# Patient Record
Sex: MALE | Race: WHITE | Employment: OTHER | URBAN - METROPOLITAN AREA
[De-identification: names, ages, dates, MRNs, and addresses within clinical notes are randomized per-mention and may not be internally consistent; named-entity substitution may affect disease eponyms.]

---

## 2017-11-07 ENCOUNTER — OFFICE VISIT (OUTPATIENT)
Dept: OPHTHALMOLOGY | Facility: CLINIC | Age: 82
End: 2017-11-07

## 2017-11-07 DIAGNOSIS — H52.00 HYPERMETROPIA, UNSPECIFIED LATERALITY: ICD-10-CM

## 2017-11-07 DIAGNOSIS — H25.10 SENILE NUCLEAR SCLEROSIS, UNSPECIFIED LATERALITY: Primary | ICD-10-CM

## 2017-11-07 RX ORDER — PHENOL 1.4 %
6 AEROSOL, SPRAY (ML) MUCOUS MEMBRANE
COMMUNITY

## 2017-11-07 RX ORDER — LOSARTAN POTASSIUM 25 MG/1
75 TABLET ORAL
COMMUNITY
Start: 2016-10-31

## 2017-11-07 RX ORDER — WARFARIN SODIUM 5 MG/1
TABLET ORAL
COMMUNITY
Start: 2017-06-07

## 2017-11-07 RX ORDER — CLONAZEPAM 0.5 MG/1
0.12 TABLET ORAL
COMMUNITY
Start: 2017-09-22 | End: 2018-08-07 | Stop reason: ALTCHOICE

## 2017-11-07 ASSESSMENT — VISUAL ACUITY
OD_SC+: -2
OS_SC: 20/40
OD_SC: 20/30
METHOD: SNELLEN - LINEAR
OS_PH_SC: 20/25

## 2017-11-07 ASSESSMENT — REFRACTION_MANIFEST
OS_CYLINDER: +0.25
OS_ADD: +2.50
OS_AXIS: 075
OD_AXIS: 155
OD_SPHERE: +0.75
OD_ADD: +2.50
OD_CYLINDER: +0.25
OS_SPHERE: +0.50

## 2017-11-07 ASSESSMENT — EXTERNAL EXAM - LEFT EYE: OS_EXAM: NORMAL

## 2017-11-07 ASSESSMENT — REFRACTION_WEARINGRX
OD_SPHERE: +2.50
OS_SPHERE: +2.50

## 2017-11-07 ASSESSMENT — CONF VISUAL FIELD
OD_NORMAL: 1
OS_NORMAL: 1
METHOD: COUNTING FINGERS

## 2017-11-07 ASSESSMENT — EXTERNAL EXAM - RIGHT EYE: OD_EXAM: NORMAL

## 2017-11-07 ASSESSMENT — CUP TO DISC RATIO: OS_RATIO: 0.35

## 2017-11-07 ASSESSMENT — SLIT LAMP EXAM - LIDS
COMMENTS: NORMAL
COMMENTS: NORMAL

## 2017-11-07 ASSESSMENT — TONOMETRY
OD_IOP_MMHG: 07
OS_IOP_MMHG: 13
IOP_METHOD: ICARE

## 2017-11-07 ASSESSMENT — PACHYMETRY
OD_CT(UM): 575
EXAM_DATE: 11/19/2014
OS_CT(UM): 578

## 2017-11-07 NOTE — NURSING NOTE
Chief Complaints and History of Present Illnesses   Patient presents with     Yearly Exam      Glaucoma suspect     HPI    Affected eye(s):  Both   Symptoms:        Frequency:  Constant       Do you have eye pain now?:  No      Comments:  Feels that the va has decreased and feels that it might be due to a medication  No F&F  Has a small brown spot in the center of the va for about 6 yrs  Debra LUEVANO 10:08 AM November 7, 2017

## 2017-11-07 NOTE — MR AVS SNAPSHOT
After Visit Summary   2017    Jair Quintanilla    MRN: [id number]           Patient Information     Date Of Birth          1935        Visit Information        Provider Department      2017 10:00 AM Neptali Chawla MD [facility]        Today's Diagnoses     Senile nuclear sclerosis, unspecified laterality - Both Eyes    -  1    Hypermetropia, unspecified laterality - Both Eyes           Follow-ups after your visit        Follow-up notes from your care team     Return in about 1 year (around 2018) for Complete Eye Exam, Cataract.      Who to contact     Please call your clinic at [phone] to:    Ask questions about your health    Make or cancel appointments    Discuss your medicines    Learn about your test results    Speak to your doctor   If you have compliments or concerns about an experience at your clinic, or if you wish to file a complaint, please contact [facility] Patient Relations at [phone] or email us at [email or web address]         Additional Information About Your Visit        MyChart Information     [employer] is an electronic gateway that provides easy, online access to your medical records. With [employer], you can request a clinic appointment, read your test results, renew a prescription or communicate with your care team.     To sign up for [employer] visit the website at [email or web address]   You will be asked to enter the access code listed below, as well as some personal information. Please follow the directions to create your username and password.     Your access code is: [email or web address]  Expires: 2018  5:30 AM     Your access code will  in 90 days. If you need help or a new code, please contact your [facility] or call [phone] for assistance.        Care EveryWhere ID     This is your Care EveryWhere ID. This could be used by other  organizations to access your [place] medical records  [id number]         Blood Pressure from Last 3 Encounters:   No data found for BP    Weight from Last 3 Encounters:   No data found for Wt              Today, you had the following     No orders found for display       Primary Care Provider Office Phone # Fax #    Jami Woodruff [phone]       [facility] [address]  [place] [address]        Equal Access to Services     NANCY [facility] HospitalRADHA : Hadii aad ku hadasho Soomaali, waaxda luqadaha, qaybta kaalmada adeegyada, waxay idiin hayaan adeeg kharash la'aan . So [facility] [phone].    ATENCIÓN: Si habla español, tiene a tai disposición servicios gratuitos de asistencia lingüística. [facility] [phone].    We comply with applicable federal civil rights laws and [place] laws. We do not discriminate on the basis of race, color, national origin, age, disability, sex, sexual orientation, or gender identity.            Thank you!     Thank you for choosing [facility]  for your care. Our goal is always to provide you with excellent care. Hearing back from our patients is one way we can continue to improve our services. Please take a few minutes to complete the written survey that you may receive in the mail after your visit with us. Thank you!             Your Updated Medication List - Protect others around you: Learn how to safely use, store and throw away your medicines at www.disposemymeds.org.          This list is accurate as of: 11/7/17  8:23 PM.  Always use your most recent med list.                   Brand Name Dispense Instructions for use Diagnosis    aspirin 81 MG tablet      Take 81 mg by mouth daily        clonazePAM 0.5 MG tablet    klonoPIN     Take 0.125 mg by mouth        * LOSARTAN POTASSIUM PO      Take 12.5 mg by mouth daily        * losartan 25 MG tablet    COZAAR     Take 75 mg by mouth        Melatonin 10 MG Tabs tablet       Take 5 mg by mouth        MELATONIN PO      Take 5 mg by mouth At Bedtime        METOPROLOL TARTRATE PO      Take 100 mg by mouth daily        UNABLE TO FIND      Lutien        VITAMIN D-1000 MAX ST 1000 UNITS Tabs   Generic drug:  cholecalciferol      Take 2,000 Units by mouth        * WARFARIN SODIUM PO      Take 27.5 mg by mouth every 7 days 2.5 or 5 mg daily        * warfarin 5 MG tablet    COUMADIN     TAKE 5MG (1 TABLET) 4 DAYS A WEEK AND 2.5MG (1/2 TABLET) 3 DAYS A WEEK.        * Notice:  This list has 4 medication(s) that are the same as other medications prescribed for you. Read the directions carefully, and ask your doctor or other care provider to review them with you.

## 2017-11-08 NOTE — PROGRESS NOTES
Assessment & Plan      Jair Quintanilla is a 82 year old male with the following diagnoses:   (H25.10) Senile nuclear sclerosis, unspecified laterality - Both Eyes  (primary encounter diagnosis)  Comment: Mild  Plan: Follow    (H52.00) Hypermetropia, unspecified laterality - Both Eyes  Comment: Significant  Plan: Rx for distance glasses     -----------------------------------------------------------------------------------      Patient disposition:   Return in about 1 year (around 11/7/2018) for Complete Eye Exam, Cataract. or sooner as needed.    Complete documentation of historical and exam elements from today's encounter can  be found in the full encounter summary report (not reduplicated in this progress  note). I personally obtained the chief complaint(s) and history of present illness. I  confirmed and edited as necessary the review of systems, past medical/surgical  history, family history, social history, and examination findings as documented by  others; and I examined the patient myself. I personally reviewed the relevant tests,  images, and reports as documented above. I formulated and edited as necessary the  assessment and plan and discussed the findings and management plan with the  patient and family.    MARK Chawla M.D

## 2018-08-07 ENCOUNTER — OFFICE VISIT (OUTPATIENT)
Dept: SLEEP MEDICINE | Facility: CLINIC | Age: 83
End: 2018-08-07
Payer: MEDICARE

## 2018-08-07 VITALS
WEIGHT: 150 LBS | TEMPERATURE: 98.4 F | OXYGEN SATURATION: 100 % | DIASTOLIC BLOOD PRESSURE: 84 MMHG | SYSTOLIC BLOOD PRESSURE: 165 MMHG | HEIGHT: 71 IN | HEART RATE: 111 BPM | RESPIRATION RATE: 16 BRPM | BODY MASS INDEX: 21 KG/M2

## 2018-08-07 DIAGNOSIS — I50.22 CHRONIC SYSTOLIC CONGESTIVE HEART FAILURE (H): ICD-10-CM

## 2018-08-07 DIAGNOSIS — F33.0 MILD EPISODE OF RECURRENT MAJOR DEPRESSIVE DISORDER (H): ICD-10-CM

## 2018-08-07 DIAGNOSIS — G47.31 CENTRAL SLEEP APNEA: ICD-10-CM

## 2018-08-07 DIAGNOSIS — F33.0 MAJOR DEPRESSIVE DISORDER, RECURRENT EPISODE, MILD (H): Primary | ICD-10-CM

## 2018-08-07 DIAGNOSIS — I10 ESSENTIAL HYPERTENSION: ICD-10-CM

## 2018-08-07 PROCEDURE — 99204 OFFICE O/P NEW MOD 45 MIN: CPT | Performed by: INTERNAL MEDICINE

## 2018-08-07 RX ORDER — MIRTAZAPINE 7.5 MG/1
7.5 TABLET, FILM COATED ORAL AT BEDTIME
Qty: 30 TABLET | Refills: 1 | Status: SHIPPED | OUTPATIENT
Start: 2018-08-07 | End: 2018-10-05

## 2018-08-07 NOTE — MR AVS SNAPSHOT
After Visit Summary   8/7/2018    Jair Quintanilla    MRN: [id number]           Patient Information     Date Of Birth          1935        Visit Information        Provider Department      8/7/2018 2:00 PM Cedric Webber MD [facility]        Today's Diagnoses     Major depressive disorder, recurrent episode, mild (H)    -  1      Care Instructions      Your BMI is Body mass index is 20.92 kg/(m^2).  Weight management is a personal decision.  If you are interested in exploring weight loss strategies, the following discussion covers the approaches that may be successful. Body mass index (BMI) is one way to tell whether you are at a healthy weight, overweight, or obese. It measures your weight in relation to your height.  A BMI of 18.5 to 24.9 is in the healthy range. A person with a BMI of 25 to 29.9 is considered overweight, and someone with a BMI of 30 or greater is considered obese. More than two-thirds of American adults are considered overweight or obese.  Being overweight or obese increases the risk for further weight gain. Excess weight may lead to heart disease and diabetes.  Creating and following plans for healthy eating and physical activity may help you improve your health.  Weight control is part of healthy lifestyle and includes exercise, emotional health, and healthy eating habits. Careful eating habits lifelong are the mainstay of weight control. Though there are significant health benefits from weight loss, long-term weight loss with diet alone may be very difficult to achieve- studies show long-term success with dietary management in less than 10% of people. Attaining a healthy weight may be especially difficult to achieve in those with severe obesity. In some cases, medications, devices and surgical management might be considered.  What can you do?  If you are overweight or obese and are interested in methods for weight loss, you should discuss this with your  provider.     Consider reducing daily calorie intake by 500 calories.     Keep a food journal.     Avoiding skipping meals, consider cutting portions instead.    Diet combined with exercise helps maintain muscle while optimizing fat loss. Strength training is particularly important for building and maintaining muscle mass. Exercise helps reduce stress, increase energy, and improves fitness. Increasing exercise without diet control, however, may not burn enough calories to loose weight.       Start walking three days a week 10-20 minutes at a time    Work towards walking thirty minutes five days a week     Eventually, increase the speed of your walking for 1-2 minutes at time    In addition, we recommend that you review healthy lifestyles and methods for weight loss available through the National Institutes of Health patient information sites:  http://win.niddk.nih.gov/publications/index.htm    And look into health and wellness programs that may be available through your health insurance provider, employer, local community center, or radha club.                  Follow-ups after your visit        Your next 10 appointments already scheduled     Oct 15, 2018  9:30 AM CDT   Return Sleep Patient with Cedric Webber MD   [facility] ([facility]    [address] [place] [address] [phone]              Who to contact     If you have questions or need follow up information about today's clinic visit or your schedule please contact [facility] directly at [phone].  Normal or non-critical lab and imaging results will be communicated to you by MyChart, letter or phone within 4 business days after the clinic has received the results. If you do not hear from us within 7 days, please contact the clinic through MyChart or phone. If you have a critical or abnormal lab result, we will notify you by phone as soon as possible.  Submit refill  "requests through [employer] or call your pharmacy and they will forward the refill request to us. Please allow 3 business days for your refill to be completed.          Additional Information About Your Visit        [employer] Information     [employer] lets you send messages to your doctor, view your test results, renew your prescriptions, schedule appointments and more. To sign up, go to [email or web address] . Click on \"Log in\" on the left side of the screen, which will take you to the Welcome page. Then click on \"Sign up Now\" on the right side of the page.     You will be asked to enter the access code listed below, as well as some personal information. Please follow the directions to create your username and password.     Your access code is: [email or web address]  Expires: 2018  3:23 PM     Your access code will  in 90 days. If you need help or a new code, please call your [place] clinic or [phone].        Care EveryWhere ID     This is your Care EveryWhere ID. This could be used by other organizations to access your [place] medical records  [id number]        Your Vitals Were     Pulse Temperature Respirations Height Pulse Oximetry BMI (Body Mass Index)    111 98.4  F (36.9  C) (Oral) 16 1.803 m (5' 11\") 100% 20.92 kg/m2       Blood Pressure from Last 3 Encounters:   18 165/84    Weight from Last 3 Encounters:   18 68 kg (150 lb)              Today, you had the following     No orders found for display         Today's Medication Changes          These changes are accurate as of 18  3:23 PM.  If you have any questions, ask your nurse or doctor.               Start taking these medicines.        Dose/Directions    mirtazapine 7.5 MG Tabs tablet   Commonly known as:  REMERON   Used for:  Major depressive disorder, recurrent episode, mild (H)   Started by:  Cedric Webber MD        Dose:  7.5 mg   Take 1 tablet (7.5 mg) by mouth At Bedtime   Quantity:  30 tablet   Refills:  1         Stop taking " these medicines if you haven't already. Please contact your care team if you have questions.     clonazePAM 0.5 MG tablet   Commonly known as:  klonoPIN   Stopped by:  Cedric Webber MD                Where to get your medicines      These medications were sent to [facility] [address] [facility] [address] AT [facility]  [address], [facility] [address]     Phone:  [phone]     mirtazapine 7.5 MG Tabs tablet                Primary Care Provider Office Phone # Fax #    Jami Woodruff [phone]       [facility] [address]  [facility] [address]        Equal Access to Services     KELLY PAN : Hadii zulma new hadasho Soomaali, waaxda luqadaha, qaybta kaalmada adeegyada, waxay idiin haybrianan james eckert . So [facility] [phone].    ATENCIÓN: Si habla español, tiene a tai disposición servicios gratuitos de asistencia lingüística. [facility] [phone].    We comply with applicable federal civil rights laws and [place] laws. We do not discriminate on the basis of race, color, national origin, age, disability, sex, sexual orientation, or gender identity.            Thank you!     Thank you for choosing [facility]  for your care. Our goal is always to provide you with excellent care. Hearing back from our patients is one way we can continue to improve our services. Please take a few minutes to complete the written survey that you may receive in the mail after your visit with us. Thank you!             Your Updated Medication List - Protect others around you: Learn how to safely use, store and throw away your medicines at www.disposemymeds.org.          This list is accurate as of 8/7/18  3:23 PM.  Always use your most recent med list.                   Brand Name Dispense Instructions for use Diagnosis    aspirin 81 MG tablet      Take 81 mg by mouth daily        * LOSARTAN POTASSIUM PO      Take 12.5 mg by  mouth daily        * losartan 25 MG tablet    COZAAR     Take 75 mg by mouth        Melatonin 10 MG Tabs tablet      Take 5 mg by mouth        MELATONIN PO      Take 5 mg by mouth At Bedtime        METOPROLOL TARTRATE PO      Take 100 mg by mouth daily        mirtazapine 7.5 MG Tabs tablet    REMERON    30 tablet    Take 1 tablet (7.5 mg) by mouth At Bedtime    Major depressive disorder, recurrent episode, mild (H)       UNABLE TO FIND      Lutien        VITAMIN D-1000 MAX ST 1000 units Tabs   Generic drug:  cholecalciferol      Take 2,000 Units by mouth        * WARFARIN SODIUM PO      Take 27.5 mg by mouth every 7 days 2.5 or 5 mg daily        * warfarin 5 MG tablet    COUMADIN     TAKE 5MG (1 TABLET) 4 DAYS A WEEK AND 2.5MG (1/2 TABLET) 3 DAYS A WEEK.        * Notice:  This list has 4 medication(s) that are the same as other medications prescribed for you. Read the directions carefully, and ask your doctor or other care provider to review them with you.

## 2018-08-07 NOTE — PATIENT INSTRUCTIONS

## 2018-08-07 NOTE — NURSING NOTE
"Chief Complaint   Patient presents with     Sleep Problem     Central sleep apnea       Initial /84  Pulse 111  Temp 98.4  F (36.9  C) (Oral)  Resp 16  Ht 1.803 m (5' 11\")  Wt 68 kg (150 lb)  SpO2 100%  BMI 20.92 kg/m2 Estimated body mass index is 20.92 kg/(m^2) as calculated from the following:    Height as of this encounter: 1.803 m (5' 11\").    Weight as of this encounter: 68 kg (150 lb).    Medication Reconciliation: complete    ESS 17    Neck 38cm    Rupa Cruz MA      "

## 2018-08-07 NOTE — Clinical Note
I saw Mr. Quintanilla who is absolutely delightful. His symptoms are a little non specific but he could be having some depression. SSRI  and SNRI antidepressants can worsen dream enactment. I will try Mirtazapine, which may have a lesser risk and see how things go for him.   Thanks  Cedric

## 2018-08-08 NOTE — PROGRESS NOTES
"Visit Date:   08/07/2018      CHIEF COMPLAINT:  Brain fog.      HISTORY OF PRESENT ILLNESS:  Mr. Quintanilla is an 82-year-old male, a retired investment salesman who has been sent for a consultation by Dr. Virginie Garcia.  The patient has seen Dr. Garcia for at least last 4 years for management of his sleep problems.  He has central sleep apnea with previous trials of bilevel PAP therapy and ASV.  He has not had a significant benefit from the use of either BiPAP or ASV.  I note that Dr. Garcia's notes have previously mentioned some improvement in sleep quality with ASV.  These therapies were discontinued as he has a mild LV dysfunction. Ejection fractions have ranged in the 40% range.  He has tried oxygen therapy which was not beneficial.  Currently, he and Dr. Garcia have decided that his central sleep apnea does not need active interventions.      He is here in clinic today to meet with me to discuss his brain fog and possible depression.      The patient has a previous history of major depressive episodes.  The first one was 30 years ago and the second one was 25 years ago.  He was treated by psychiatrist in [place] and in [place].  He apparently had a good response to an antidepressant agent, which he thinks was amitriptyline.  He denies any previous history of suicidality.  History is negative for any episodic mood fluctuations that could suggest a bipolar disorder.  There is no history of psychotic symptoms.      The patient reports that he has a \"brain fog\" during the day.  On further exploration, he reports that this means a light pressure behind his eyes, a decrease in concentration, diminished cognitive processing and a lack of ability to do cognitive tasks.  According to him, his daytime dysfunction or a brain fog can range anywhere from 20% to 90% throughout the day.  He agreed readily with Dr. Garcia that he could be having depression.  However, he downplays any depression of mood or anhedonia.  " Later, he thought that he could be having a mildly depressed mood, especially when he experienced a significant brain fog.  His appetite has been low, and he has experienced some weight loss.  His sleep quality has been poor.  He experiences fatigue during the day.  He denies any suicidal thoughts.  He added that on one day, he was overwhelmed by his symptoms and commented to his wife that he cannot go on like this.  He emphatically denies that he has any suicidal thoughts or plans.      There is no history of significant anxiety.  There are no OCD symptoms.      The patient goes to bed at midnight.  Sleep latency is usually around 30 minutes.  He estimates waking up 3-4 times during the night to use the bathroom.  He can return to sleep after about 15 minutes.  Wake time in the morning is at 9:00 a.m.  He feels tired on waking up.  He feels tired throughout the day.  He reports his Sedan Sleepiness Scale Score at 17/24.      He takes a 30-minute nap on most days.  He drinks 3 caffeinated drinks daily.      He has a mild tremor.  He was evaluated by Neurology at [facility] by Dr. Montanez.  He was not thought to have any neurodegenerative disorders at the time.      The patient has REM sleep behavioral disorder.  He is currently managing this with melatonin at 5 mg at night.  He was previously on clonazepam which was discontinued to see if it makes a difference to his brain fog.      PAST MEDICAL HISTORY:   1.  Hypertension.   2.  Atrial fibrillation.   3.  Congestive heart failure.   4.  Prostate cancer.   5.  Central sleep apnea.      FAMILY HISTORY:  His mother and his brother had depression.  His maternal grandfather committed suicide with a shotgun.      SOCIAL HISTORY:  He lives with his wife and is retired.      REVIEW OF SYSTEMS:  A complete review of systems was negative except for weight loss, shortness of breath, dry cough and frequent urination.     MENTAL STATUS EXAM  Appearance: well dressed and  "groomed, cooperative, appropriate eye contact   Eyre Contact: normal  Speech: spontaneous, normal rate, tone and volume  Mood: mildly depressed mood  Affect: Mood Congruient   Thought: Linear, no thought disorders. No delusions. No suicidal or homicidal thoughts.   Perception: No hallucinations   Orientation: oreinted to person , place, time and situation  Insight and judgement: good      ASSESSMENT:   1.  Major depressive disorder, recurrent, mild.   2.  Central sleep apnea.   3.  REM sleep behavioral disorder.   4.  Hypertension.   5.  Congestive heart failure.      The patient is sent for an evaluation of possible depression contributing to his daytime dysfunction, which according to the patient is characterized as \"brain fog.\"  This includes fatigue, reduced concentration, reduced capacity for cognitive tasks, some lowering of mood, appetite changes and sleep disturbance.  The patient does not strongly endorse depression of mood or anhedonia, but on further exploration endorses some disturbances in these areas.  He does not have suicidal thoughts and is at low risk.  We completed a PHQ-9 and geriatric depression scale (short form).  Score on PHQ-9 was 11, which may indicate mild to moderate depression.  Score on the geriatric depression scale was 5, which is equivocal.      Clinically, I think that mild depression is possible.  There is no indication of bipolar disorder or psychotic symptoms.      We discussed antidepressant therapy.  Most antidepressants will have the risk of worsening REM sleep behavioral disorder.  My considerations are either mirtazapine or bupropion.  There is some literature showing less risk of exacerbation of RBD with the mirtazapine compared to SSRIs or SNRIs.  However, the risk for worsening of RBD still exists with Mirtazapine.  This was fully discussed with the patient.  Bupropion may have less risk for worsening of RBD but may not help with his reduced appetite.  After a detailed " discussion, we agreed on a trial of mirtazapine, which I will start at a dose of 7.5 mg at night.  The patient will monitor closely for any dream enactment behavior and will continue to make sure that his sleep environment is safe.      TREATMENT PLAN:   1.  Mirtazapine at a dose of 7.5 mg at night.   2.  Follow up in 1 month to review progress.      I spent a total of 45 minutes with this patient, with more than 50% spent in counseling.         ALANNAH LOZANO MD             D: 2018   T: 2018   MT: ALEXANDR      Name:     SERGO ROLON   MRN:      [id number]        Account:      [id number]   :      1935           Visit Date:   2018      Document: [id number]       cc: Jami Woodruff MD

## 2018-08-09 PROBLEM — I50.22 CHRONIC SYSTOLIC CONGESTIVE HEART FAILURE (H): Status: ACTIVE | Noted: 2018-08-09

## 2018-08-09 PROBLEM — I10 HTN (HYPERTENSION): Status: ACTIVE | Noted: 2018-08-09

## 2018-08-09 PROBLEM — G47.31 CENTRAL SLEEP APNEA: Status: ACTIVE | Noted: 2018-08-09

## 2018-08-09 PROBLEM — F33.0 MILD EPISODE OF RECURRENT MAJOR DEPRESSIVE DISORDER (H): Status: ACTIVE | Noted: 2018-08-09

## 2018-09-11 ENCOUNTER — OFFICE VISIT (OUTPATIENT)
Dept: SLEEP MEDICINE | Facility: CLINIC | Age: 83
End: 2018-09-11
Payer: MEDICARE

## 2018-09-11 VITALS
SYSTOLIC BLOOD PRESSURE: 132 MMHG | OXYGEN SATURATION: 99 % | HEIGHT: 71 IN | BODY MASS INDEX: 21 KG/M2 | DIASTOLIC BLOOD PRESSURE: 70 MMHG | TEMPERATURE: 98.3 F | RESPIRATION RATE: 16 BRPM | HEART RATE: 71 BPM | WEIGHT: 150 LBS

## 2018-09-11 DIAGNOSIS — F33.0 MAJOR DEPRESSIVE DISORDER, RECURRENT EPISODE, MILD (H): Primary | ICD-10-CM

## 2018-09-11 PROCEDURE — 99214 OFFICE O/P EST MOD 30 MIN: CPT | Performed by: INTERNAL MEDICINE

## 2018-09-11 NOTE — NURSING NOTE
"Chief Complaint   Patient presents with     Sleep Problem     Follow up medication        Initial /70  Pulse 71  Temp 98.3  F (36.8  C) (Oral)  Resp 16  Ht 1.803 m (5' 11\")  Wt 68 kg (150 lb)  SpO2 99%  BMI 20.92 kg/m2 Estimated body mass index is 20.92 kg/(m^2) as calculated from the following:    Height as of this encounter: 1.803 m (5' 11\").    Weight as of this encounter: 68 kg (150 lb).    Medication Reconciliation: complete    ESS 14  Rupa Cruz MA      "

## 2018-09-11 NOTE — MR AVS SNAPSHOT
After Visit Summary   9/11/2018    Jair Quintanilla    MRN: [id number]           Patient Information     Date Of Birth          1935        Visit Information        Provider Department      9/11/2018 11:00 AM Cedric Webber MD [facility] Instructions      Your BMI is Body mass index is 20.92 kg/(m^2).  Weight management is a personal decision.  If you are interested in exploring weight loss strategies, the following discussion covers the approaches that may be successful. Body mass index (BMI) is one way to tell whether you are at a healthy weight, overweight, or obese. It measures your weight in relation to your height.  A BMI of 18.5 to 24.9 is in the healthy range. A person with a BMI of 25 to 29.9 is considered overweight, and someone with a BMI of 30 or greater is considered obese. More than two-thirds of American adults are considered overweight or obese.  Being overweight or obese increases the risk for further weight gain. Excess weight may lead to heart disease and diabetes.  Creating and following plans for healthy eating and physical activity may help you improve your health.  Weight control is part of healthy lifestyle and includes exercise, emotional health, and healthy eating habits. Careful eating habits lifelong are the mainstay of weight control. Though there are significant health benefits from weight loss, long-term weight loss with diet alone may be very difficult to achieve- studies show long-term success with dietary management in less than 10% of people. Attaining a healthy weight may be especially difficult to achieve in those with severe obesity. In some cases, medications, devices and surgical management might be considered.  What can you do?  If you are overweight or obese and are interested in methods for weight loss, you should discuss this with your provider.     Consider reducing daily calorie intake by 500 calories.     Keep a food  journal.     Avoiding skipping meals, consider cutting portions instead.    Diet combined with exercise helps maintain muscle while optimizing fat loss. Strength training is particularly important for building and maintaining muscle mass. Exercise helps reduce stress, increase energy, and improves fitness. Increasing exercise without diet control, however, may not burn enough calories to loose weight.       Start walking three days a week 10-20 minutes at a time    Work towards walking thirty minutes five days a week     Eventually, increase the speed of your walking for 1-2 minutes at time    In addition, we recommend that you review healthy lifestyles and methods for weight loss available through the National Institutes of Health patient information sites:  http://win.niddk.nih.gov/publications/index.htm    And look into health and wellness programs that may be available through your health insurance provider, employer, local community center, or radha club.                  Follow-ups after your visit        Your next 10 appointments already scheduled     Oct 11, 2018 11:00 AM CDT   Return Sleep Patient with Cedric Webber MD   [facility] ([facility])    [address] [place] [address] [phone]              Who to contact     If you have questions or need follow up information about today's clinic visit or your schedule please contact [facility] directly at [phone].  Normal or non-critical lab and imaging results will be communicated to you by MyChart, letter or phone within 4 business days after the clinic has received the results. If you do not hear from us within 7 days, please contact the clinic through MyChart or phone. If you have a critical or abnormal lab result, we will notify you by phone as soon as possible.  Submit refill requests through [employer] or call your pharmacy and they will forward the refill request to  "us. Please allow 3 business days for your refill to be completed.          Additional Information About Your Visit        [employer] Information     [employer] lets you send messages to your doctor, view your test results, renew your prescriptions, schedule appointments and more. To sign up, go to [email or web address] . Click on \"Log in\" on the left side of the screen, which will take you to the Welcome page. Then click on \"Sign up Now\" on the right side of the page.     You will be asked to enter the access code listed below, as well as some personal information. Please follow the directions to create your username and password.     Your access code is: [email or web address]  Expires: 2018  3:23 PM     Your access code will  in 90 days. If you need help or a new code, please call your [place] clinic or [phone].        Care EveryWhere ID     This is your [facility] EveryWhere ID. This could be used by other organizations to access your [place] medical records  [id number]        Your Vitals Were     Pulse Temperature Respirations Height Pulse Oximetry BMI (Body Mass Index)    71 98.3  F (36.8  C) (Oral) 16 1.803 m (5' 11\") 99% 20.92 kg/m2       Blood Pressure from Last 3 Encounters:   18 132/70   18 165/84    Weight from Last 3 Encounters:   18 68 kg (150 lb)   18 68 kg (150 lb)              Today, you had the following     No orders found for display         Today's Medication Changes          These changes are accurate as of 18 11:39 AM.  If you have any questions, ask your nurse or doctor.               These medicines have changed or have updated prescriptions.        Dose/Directions    losartan 25 MG tablet   Commonly known as:  COZAAR   This may have changed:  Another medication with the same name was removed. Continue taking this medication, and follow the directions you see here.   Changed by:  Cedric Webber MD        Dose:  75 mg   Take 75 mg by mouth   Refills:  0       " MELATONIN PO   This may have changed:  Another medication with the same name was removed. Continue taking this medication, and follow the directions you see here.   Changed by:  Cedric Webber MD        Dose:  7.5 mg   Take 7.5 mg by mouth At Bedtime   Refills:  0         Stop taking these medicines if you haven't already. Please contact your care team if you have questions.     METOPROLOL TARTRATE PO   Stopped by:  Cedric Webber MD           UNABLE TO FIND   Stopped by:  Cedric Webber MD                    Primary Care Provider Office Phone # Fax #    Jamirosina Woodruff [phone]       [facility] [address]  [facility] [address]        Equal Access to Services     [facility]: Hadii zulma new hadasho Sokarolina, waaxda luqadaha, qaybta kaalmada adeegyada, bette eckert . So [facility] [phone].    ATENCIÓN: Si habla español, tiene a tai disposición servicios gratuitos de asistencia lingüística. [facility] [phone].    We comply with applicable federal civil rights laws and [place] laws. We do not discriminate on the basis of race, color, national origin, age, disability, sex, sexual orientation, or gender identity.            Thank you!     Thank you for choosing [facility]  for your care. Our goal is always to provide you with excellent care. Hearing back from our patients is one way we can continue to improve our services. Please take a few minutes to complete the written survey that you may receive in the mail after your visit with us. Thank you!             Your Updated Medication List - Protect others around you: Learn how to safely use, store and throw away your medicines at www.disposemymeds.org.          This list is accurate as of 9/11/18 11:39 AM.  Always use your most recent med list.                   Brand Name Dispense Instructions for use Diagnosis    aspirin 81 MG tablet      Take 81 mg by mouth daily        losartan  25 MG tablet    COZAAR     Take 75 mg by mouth        Melatonin 10 MG Tabs tablet      Take 5 mg by mouth        MELATONIN PO      Take 7.5 mg by mouth At Bedtime        mirtazapine 7.5 MG Tabs tablet    REMERON    30 tablet    Take 1 tablet (7.5 mg) by mouth At Bedtime    Major depressive disorder, recurrent episode, mild (H)       VITAMIN D-1000 MAX ST 1000 units Tabs   Generic drug:  cholecalciferol      Take 2,000 Units by mouth        * WARFARIN SODIUM PO      Take 27.5 mg by mouth every 7 days 2.5 or 5 mg daily        * warfarin 5 MG tablet    COUMADIN     TAKE 5MG (1 TABLET) 4 DAYS A WEEK AND 2.5MG (1/2 TABLET) 3 DAYS A WEEK.        * Notice:  This list has 2 medication(s) that are the same as other medications prescribed for you. Read the directions carefully, and ask your doctor or other care provider to review them with you.

## 2018-09-12 NOTE — PROGRESS NOTES
Visit Date:   09/11/2018      CHIEF COMPLAINT:  Depression.      HISTORY OF PRESENT ILLNESS:  Mr. Quintanilla is an 83-year-old male who I first saw in clinic on 08/07/2018.  He was sent by Dr. Virginie Garcia, a sleep specialist and pulmonologist.  A detailed history can be found on my initial note from 08/07/2018.  Briefly, he was noted to have central sleep apnea but not a candidate for ASV.  He has a history of REM sleep behavioral disorder.  He had consulted me with complaints of brain fog and I had thought a mild degree of depression was possible.      After careful discussion regarding possible exacerbation of dream enactment behavior with the use of SSRI or SNRI antidepressants, we decided to start mirtazapine at a dose of 7.5 mg at night.  The patient returns today for a followup.      He reports that there is a mild improvement in his mood and his complaint of brain fog with the use of mirtazapine.  He wakes up with a bright mood in the morning but slowly deteriorates towards the evening.  He absolutely denies any suicidal thoughts.  On PHQ-9 scoring, he rated 11.  On the geriatric depression rating scale, short form, his score was 3.      He has had 2 episodes of dream enactment behavior over the last 1 month.  He admits that this is a little more than what he normally experiences which could be 1 episode in a month.  On one occasion he swung his fist and hit the headboard.  He has remained on melatonin and the dose was increased after his visit with Dr. Garcia.      We had a detailed discussion regarding risks and benefits, especially in the context of worsening of REM sleep behavioral disorder with mirtazapine.  The patient wants to continue on mirtazapine as he has found it beneficial.      ASSESSMENT:   1.  Major depressive disorder, mild, recurrent.   2.  REM sleep behavioral disorder.   3.  Central sleep apnea.      TREATMENT PLAN:   1.  Continue mirtazapine at a dose of 7.5 mg at night.   2.  Follow up  in a month to review progress.      I spent a total of 25 minutes with this patient, with more than 50% spent in counseling.         ALANNAH LOZANO MD             D: 2018   T: 2018   MT: ALEXANDR      Name:     SERGO ROLON   MRN:      [id number]        Account:      [id number]   :      1935           Visit Date:   2018      Document: [id number]       cc: Jami Woodruff MD

## 2018-10-05 DIAGNOSIS — F33.0 MAJOR DEPRESSIVE DISORDER, RECURRENT EPISODE, MILD (H): ICD-10-CM

## 2018-10-08 RX ORDER — MIRTAZAPINE 7.5 MG/1
7.5 TABLET, FILM COATED ORAL AT BEDTIME
Qty: 30 TABLET | Refills: 1 | Status: SHIPPED | OUTPATIENT
Start: 2018-10-08 | End: 2018-10-11

## 2018-10-11 ENCOUNTER — OFFICE VISIT (OUTPATIENT)
Dept: SLEEP MEDICINE | Facility: CLINIC | Age: 83
End: 2018-10-11
Payer: MEDICARE

## 2018-10-11 VITALS
HEART RATE: 78 BPM | SYSTOLIC BLOOD PRESSURE: 126 MMHG | OXYGEN SATURATION: 98 % | WEIGHT: 155 LBS | HEIGHT: 71 IN | BODY MASS INDEX: 21.7 KG/M2 | DIASTOLIC BLOOD PRESSURE: 66 MMHG | RESPIRATION RATE: 16 BRPM

## 2018-10-11 DIAGNOSIS — F33.0 MAJOR DEPRESSIVE DISORDER, RECURRENT EPISODE, MILD (H): ICD-10-CM

## 2018-10-11 PROCEDURE — 99213 OFFICE O/P EST LOW 20 MIN: CPT | Performed by: INTERNAL MEDICINE

## 2018-10-11 RX ORDER — MIRTAZAPINE 15 MG/1
15 TABLET, FILM COATED ORAL AT BEDTIME
Qty: 30 TABLET | Refills: 1 | Status: SHIPPED | OUTPATIENT
Start: 2018-10-11 | End: 2018-12-03

## 2018-10-11 NOTE — PATIENT INSTRUCTIONS
1. We are increasing dose of Mirtazapine (antidepressant) to 15 mg. A new prescription has been sent to pharmacy. Meanwhile, you can take 2 of 7.5 mg tablets.   2. Follow up in 6 weeks   Your BMI is Body mass index is 21.62 kg/(m^2).  Weight management is a personal decision.  If you are interested in exploring weight loss strategies, the following discussion covers the approaches that may be successful. Body mass index (BMI) is one way to tell whether you are at a healthy weight, overweight, or obese. It measures your weight in relation to your height.  A BMI of 18.5 to 24.9 is in the healthy range. A person with a BMI of 25 to 29.9 is considered overweight, and someone with a BMI of 30 or greater is considered obese. More than two-thirds of American adults are considered overweight or obese.  Being overweight or obese increases the risk for further weight gain. Excess weight may lead to heart disease and diabetes.  Creating and following plans for healthy eating and physical activity may help you improve your health.  Weight control is part of healthy lifestyle and includes exercise, emotional health, and healthy eating habits. Careful eating habits lifelong are the mainstay of weight control. Though there are significant health benefits from weight loss, long-term weight loss with diet alone may be very difficult to achieve- studies show long-term success with dietary management in less than 10% of people. Attaining a healthy weight may be especially difficult to achieve in those with severe obesity. In some cases, medications, devices and surgical management might be considered.  What can you do?  If you are overweight or obese and are interested in methods for weight loss, you should discuss this with your provider.     Consider reducing daily calorie intake by 500 calories.     Keep a food journal.     Avoiding skipping meals, consider cutting portions instead.    Diet combined with exercise helps maintain  muscle while optimizing fat loss. Strength training is particularly important for building and maintaining muscle mass. Exercise helps reduce stress, increase energy, and improves fitness. Increasing exercise without diet control, however, may not burn enough calories to loose weight.       Start walking three days a week 10-20 minutes at a time    Work towards walking thirty minutes five days a week     Eventually, increase the speed of your walking for 1-2 minutes at time    In addition, we recommend that you review healthy lifestyles and methods for weight loss available through the National Institutes of Health patient information sites:  http://win.niddk.nih.gov/publications/index.htm    And look into health and wellness programs that may be available through your health insurance provider, employer, local community center, or radha club.

## 2018-10-11 NOTE — MR AVS SNAPSHOT
After Visit Summary   10/11/2018    Jair Quintanilla    MRN: [id number]           Patient Information     Date Of Birth          1935        Visit Information        Provider Department      10/11/2018 11:00 AM Cedric Webber MD [facility]        Today's Diagnoses     Major depressive disorder, recurrent episode, mild (H)          Care Instructions    1. We are increasing dose of Mirtazapine (antidepressant) to 15 mg. A new prescription has been sent to pharmacy. Meanwhile, you can take 2 of 7.5 mg tablets.   2. Follow up in 6 weeks   Your BMI is Body mass index is 21.62 kg/(m^2).  Weight management is a personal decision.  If you are interested in exploring weight loss strategies, the following discussion covers the approaches that may be successful. Body mass index (BMI) is one way to tell whether you are at a healthy weight, overweight, or obese. It measures your weight in relation to your height.  A BMI of 18.5 to 24.9 is in the healthy range. A person with a BMI of 25 to 29.9 is considered overweight, and someone with a BMI of 30 or greater is considered obese. More than two-thirds of American adults are considered overweight or obese.  Being overweight or obese increases the risk for further weight gain. Excess weight may lead to heart disease and diabetes.  Creating and following plans for healthy eating and physical activity may help you improve your health.  Weight control is part of healthy lifestyle and includes exercise, emotional health, and healthy eating habits. Careful eating habits lifelong are the mainstay of weight control. Though there are significant health benefits from weight loss, long-term weight loss with diet alone may be very difficult to achieve- studies show long-term success with dietary management in less than 10% of people. Attaining a healthy weight may be especially difficult to achieve in those with severe obesity. In some cases, medications,  devices and surgical management might be considered.  What can you do?  If you are overweight or obese and are interested in methods for weight loss, you should discuss this with your provider.     Consider reducing daily calorie intake by 500 calories.     Keep a food journal.     Avoiding skipping meals, consider cutting portions instead.    Diet combined with exercise helps maintain muscle while optimizing fat loss. Strength training is particularly important for building and maintaining muscle mass. Exercise helps reduce stress, increase energy, and improves fitness. Increasing exercise without diet control, however, may not burn enough calories to loose weight.       Start walking three days a week 10-20 minutes at a time    Work towards walking thirty minutes five days a week     Eventually, increase the speed of your walking for 1-2 minutes at time    In addition, we recommend that you review healthy lifestyles and methods for weight loss available through the National Institutes of Health patient information sites:  http://win.niddk.nih.gov/publications/index.htm    And look into health and wellness programs that may be available through your health insurance provider, employer, local community center, or radha club.                Follow-ups after your visit        Who to contact     If you have questions or need follow up information about today's clinic visit or your schedule please contact [facility] directly at [phone].  Normal or non-critical lab and imaging results will be communicated to you by MyChart, letter or phone within 4 business days after the clinic has received the results. If you do not hear from us within 7 days, please contact the clinic through MyChart or phone. If you have a critical or abnormal lab result, we will notify you by phone as soon as possible.  Submit refill requests through [employer] or call your pharmacy and they will forward the refill request to us.  "Please allow 3 business days for your refill to be completed.          Additional Information About Your Visit        [employer] Information     [employer] lets you send messages to your doctor, view your test results, renew your prescriptions, schedule appointments and more. To sign up, go to [email or web address] . Click on \"Log in\" on the left side of the screen, which will take you to the Welcome page. Then click on \"Sign up Now\" on the right side of the page.     You will be asked to enter the access code listed below, as well as some personal information. Please follow the directions to create your username and password.     Your access code is: [email or web address]  Expires: 2018  3:23 PM     Your access code will  in 90 days. If you need help or a new code, please call your [place] clinic or [phone].        Care EveryWhere ID     This is your Care EveryWhere ID. This could be used by other organizations to access your [place] medical records  [id number]        Your Vitals Were     Pulse Respirations Height Pulse Oximetry BMI (Body Mass Index)       78 16 1.803 m (5' 11\") 98% 21.62 kg/m2        Blood Pressure from Last 3 Encounters:   10/11/18 126/66   18 132/70   18 165/84    Weight from Last 3 Encounters:   10/11/18 70.3 kg (155 lb)   18 68 kg (150 lb)   18 68 kg (150 lb)              Today, you had the following     No orders found for display         Today's Medication Changes          These changes are accurate as of 10/11/18 11:30 AM.  If you have any questions, ask your nurse or doctor.               These medicines have changed or have updated prescriptions.        Dose/Directions    mirtazapine 15 MG tablet   Commonly known as:  REMERON   This may have changed:    - medication strength  - how much to take   Used for:  Major depressive disorder, recurrent episode, mild (H)   Changed by:  Cedric Webber MD        Dose:  15 mg   Take 1 tablet (15 mg) by mouth At Bedtime "   Quantity:  30 tablet   Refills:  1            Where to get your medicines      These medications were sent to [id number] [address] AT [place] [address]  [address], [facility] [address]     Phone:  [phone]     mirtazapine 15 MG tablet                Primary Care Provider Office Phone # Fax #    Jami Woodruff [phone]       [facility] [address]  [facility] [address]        Equal Access to Services     [facility]: Hadii aad ku hadasho Soomaali, waaxda luqadaha, qaybta kaalmada adeegyada, bette malin hayaan james eckert . So [facility] [phone].    ATENCIÓN: Si habla español, tiene a tai disposición servicios gratuitos de asistencia lingüística. [facility] [phone].    We comply with applicable federal civil rights laws and [place] laws. We do not discriminate on the basis of race, color, national origin, age, disability, sex, sexual orientation, or gender identity.            Thank you!     Thank you for choosing [facility]  for your care. Our goal is always to provide you with excellent care. Hearing back from our patients is one way we can continue to improve our services. Please take a few minutes to complete the written survey that you may receive in the mail after your visit with us. Thank you!             Your Updated Medication List - Protect others around you: Learn how to safely use, store and throw away your medicines at www.disposemymeds.org.          This list is accurate as of 10/11/18 11:30 AM.  Always use your most recent med list.                   Brand Name Dispense Instructions for use Diagnosis    aspirin 81 MG tablet      Take 81 mg by mouth daily        losartan 25 MG tablet    COZAAR     Take 75 mg by mouth        Melatonin 10 MG Tabs tablet      Take 5 mg by mouth        mirtazapine 15 MG tablet    REMERON    30 tablet    Take 1 tablet (15 mg) by  mouth At Bedtime    Major depressive disorder, recurrent episode, mild (H)       VITAMIN D-1000 MAX ST 1000 units Tabs   Generic drug:  cholecalciferol      Take 2,000 Units by mouth        warfarin 5 MG tablet    COUMADIN     TAKE 5MG (1 TABLET) 4 DAYS A WEEK AND 2.5MG (1/2 TABLET) 3 DAYS A WEEK.

## 2018-10-11 NOTE — NURSING NOTE
"Chief Complaint   Patient presents with     Sleep Problem     medication f/u       Initial /66  Pulse 78  Resp 16  Ht 1.803 m (5' 11\")  Wt 70.3 kg (155 lb)  SpO2 98%  BMI 21.62 kg/m2 Estimated body mass index is 21.62 kg/(m^2) as calculated from the following:    Height as of this encounter: 1.803 m (5' 11\").    Weight as of this encounter: 70.3 kg (155 lb).    Medication Reconciliation: complete     ESS   Yara Telles      "

## 2018-10-12 NOTE — PROGRESS NOTES
"Visit Date:   10/11/2018      PROGRESS NOTE      DATE OF SERVICE: 10/11/2018      CHIEF COMPLAINT:  Followup for depression.      PRESENTING HISTORY:  Mr. Quintanilla is an 83-year-old male who I first saw in clinic on 08/07/2018.  He was sent by Dr. Garcia, sleep pulmonologist.  A detailed history can be found in the initial note on 08/07/2018.  He has central sleep apnea but is not a candidate for ASV due to heart failure.  He has a history of REM sleep behavioral disorder.  He had consulted me with complaints of what he calls \"brain fog\" and I had thought that a mild degree of depression was possible.      He had been started on mirtazapine at a dose of 7.5 mg at night.  He was carefully followed for any worsening of dream enactment behavior and improvement in his mood.      The patient reports that there is mild improvement in his mood, but he continues to experience tiredness and brain fog 60% of the time.  He has not experienced any adverse events.  There has been no significant dream enactment behavior or worsening of nightmares in the last 1 month.      We had a detailed discussion regarding his clinical progress and further steps.  Considering his report of some benefit from mirtazapine but ongoing symptoms, I decided to increase the dose to 15 mg.  He will continue to monitor for any emergence of dream enactment behaviors.  He will continue treatment for REM sleep behavioral disorder.     MENTAL STATUS EXAM  Appearance: appropriate  Attitude: cooperative  Behavior: normal  Eyre Contact: normal  Speech: normal  Orientation: oreinted to person , place, time and situation  Mood: mildly depressed.   Affect: Mood Congruient  Thought Process: clear  Suicidal Ideation: no suicidal thoughts, no intention  Hallucination: no     ASSESSMENT:   1.  Major depressive disorder, mild.   2.  REM sleep behavioral disorder.   3.  Central sleep apnea.      TREATMENT PLAN:   1.  Increase mirtazapine to a dose of 15 mg at night. "   2.  Follow up in 6 weeks to review progress.      I spent a total of 15 minutes with this patient, with more than 50% spent in counseling.         ALANNAH LOZANO MD             D: 10/11/2018   T: 10/12/2018   MT:       Name:     SERGO ROLON   MRN:      [id number]        Account:      [id number]   :      1935           Visit Date:   10/11/2018      Document: [id number]       cc: Jami Woodruff MD

## 2018-10-17 ENCOUNTER — TRANSFERRED RECORDS (OUTPATIENT)
Dept: HEALTH INFORMATION MANAGEMENT | Facility: CLINIC | Age: 83
End: 2018-10-17

## 2018-12-03 DIAGNOSIS — F33.0 MAJOR DEPRESSIVE DISORDER, RECURRENT EPISODE, MILD (H): ICD-10-CM

## 2018-12-05 RX ORDER — MIRTAZAPINE 15 MG/1
15 TABLET, FILM COATED ORAL AT BEDTIME
Qty: 30 TABLET | Refills: 1 | Status: SHIPPED | OUTPATIENT
Start: 2018-12-05 | End: 2019-02-04

## 2018-12-14 ENCOUNTER — OFFICE VISIT (OUTPATIENT)
Dept: SLEEP MEDICINE | Facility: CLINIC | Age: 83
End: 2018-12-14
Payer: MEDICARE

## 2018-12-14 VITALS
DIASTOLIC BLOOD PRESSURE: 63 MMHG | HEIGHT: 71 IN | OXYGEN SATURATION: 99 % | SYSTOLIC BLOOD PRESSURE: 108 MMHG | BODY MASS INDEX: 20.44 KG/M2 | WEIGHT: 146 LBS | HEART RATE: 58 BPM | RESPIRATION RATE: 16 BRPM

## 2018-12-14 DIAGNOSIS — F33.0 MAJOR DEPRESSIVE DISORDER, RECURRENT EPISODE, MILD (H): Primary | ICD-10-CM

## 2018-12-14 PROCEDURE — 99214 OFFICE O/P EST MOD 30 MIN: CPT | Performed by: INTERNAL MEDICINE

## 2018-12-14 RX ORDER — MIRTAZAPINE 7.5 MG/1
7.5 TABLET, FILM COATED ORAL AT BEDTIME
Qty: 30 TABLET | Refills: 3 | Status: SHIPPED | OUTPATIENT
Start: 2018-12-14 | End: 2019-04-15

## 2018-12-14 ASSESSMENT — MIFFLIN-ST. JEOR: SCORE: 1379.38

## 2018-12-14 NOTE — PATIENT INSTRUCTIONS
Plan:     1. Increase dose of Mirtazapine (Remeron) to 22.5 mg. This means you will be taking a 15 mg tablet and a 7.5 mg tablet to make total dose of 22.5 mg   2. Follow up in 6 weeks     Your BMI is Body mass index is 20.36 kg/m .  Weight management is a personal decision.  If you are interested in exploring weight loss strategies, the following discussion covers the approaches that may be successful. Body mass index (BMI) is one way to tell whether you are at a healthy weight, overweight, or obese. It measures your weight in relation to your height.  A BMI of 18.5 to 24.9 is in the healthy range. A person with a BMI of 25 to 29.9 is considered overweight, and someone with a BMI of 30 or greater is considered obese. More than two-thirds of American adults are considered overweight or obese.  Being overweight or obese increases the risk for further weight gain. Excess weight may lead to heart disease and diabetes.  Creating and following plans for healthy eating and physical activity may help you improve your health.  Weight control is part of healthy lifestyle and includes exercise, emotional health, and healthy eating habits. Careful eating habits lifelong are the mainstay of weight control. Though there are significant health benefits from weight loss, long-term weight loss with diet alone may be very difficult to achieve- studies show long-term success with dietary management in less than 10% of people. Attaining a healthy weight may be especially difficult to achieve in those with severe obesity. In some cases, medications, devices and surgical management might be considered.  What can you do?  If you are overweight or obese and are interested in methods for weight loss, you should discuss this with your provider.     Consider reducing daily calorie intake by 500 calories.     Keep a food journal.     Avoiding skipping meals, consider cutting portions instead.    Diet combined with exercise helps maintain  muscle while optimizing fat loss. Strength training is particularly important for building and maintaining muscle mass. Exercise helps reduce stress, increase energy, and improves fitness. Increasing exercise without diet control, however, may not burn enough calories to loose weight.       Start walking three days a week 10-20 minutes at a time    Work towards walking thirty minutes five days a week     Eventually, increase the speed of your walking for 1-2 minutes at time    In addition, we recommend that you review healthy lifestyles and methods for weight loss available through the National Institutes of Health patient information sites:  http://win.niddk.nih.gov/publications/index.htm    And look into health and wellness programs that may be available through your health insurance provider, employer, local community center, or radha club.    Weight management plan Patient was referred to their PCP to discuss a diet and exercise plan.

## 2018-12-14 NOTE — NURSING NOTE
"Chief Complaint   Patient presents with     Sleep Problem     Follow up medication for rls, is wondering how to measure progress        Initial /63   Pulse 58   Resp 16   Ht 1.803 m (5' 11\")   Wt 66.2 kg (146 lb)   SpO2 99%   BMI 20.36 kg/m   Estimated body mass index is 20.36 kg/m  as calculated from the following:    Height as of this encounter: 1.803 m (5' 11\").    Weight as of this encounter: 66.2 kg (146 lb).    Medication Reconciliation: complete    ESS 16    Rupa Cruz MA    "

## 2018-12-15 NOTE — PROGRESS NOTES
Visit Date:   2018      CHIEF COMPLAINT:  Depression.      HISTORY OF PRESENT ILLNESS:  Mr. Quintanilla is an 83-year-old male who I first saw in clinic on 2018.  He had previously followed up with Dr. Garcia, sleep specialist.  A detailed history can be found in my initial note on 2018.  He has central sleep apnea, but is not a candidate for ASV and has a history of REM sleep behavioral disorder.      The patient had consulted me with complaints of brain fog and we had agreed that some degree of depression was possible.  The patient was started on mirtazapine at a dose of 7.5 mg at night with the dose progressively increased to 15 mg at night.  Today, he was accompanied by his son who lives in [facility].      The patient has not experienced any adverse effects with the use of mirtazapine.  He reports that there has been improvement in his brain fog.  This is a term that he has been using to describe impaired concentration, cognitive ability and motivation.  His son also seemed to agree that his daytime function is better compared to 6 months to 12 months ago.      He reports that his mood has improved, but he continues to have some negative thoughts and a depressed mood.  After a detailed discussion, we decided to increase the dose of mirtazapine further, at this time to 22.5 mg daily.      ASSESSMENT:  Major depressive disorder, recurrent, mild.      TREATMENT PLAN:   1.  To increase mirtazapine to 22.5 mg at night.   2.  Follow up in 6 weeks.      I spent a total of 25 minutes with this patient, with more than 50% spent in counseling.         ALANNAH LOZANO MD             D: 2018   T: 12/15/2018   MT: MARIANO      Name:     SERGO QUINTANILLA   MRN:      [id number]        Account:      [id number]   :      1935           Visit Date:   2018      Document: [id number]       cc: Lizbeth Roth MD

## 2019-02-04 ENCOUNTER — OFFICE VISIT (OUTPATIENT)
Dept: SLEEP MEDICINE | Facility: CLINIC | Age: 84
End: 2019-02-04
Payer: MEDICARE

## 2019-02-04 VITALS
HEIGHT: 71 IN | OXYGEN SATURATION: 96 % | HEART RATE: 61 BPM | DIASTOLIC BLOOD PRESSURE: 67 MMHG | RESPIRATION RATE: 16 BRPM | BODY MASS INDEX: 21 KG/M2 | SYSTOLIC BLOOD PRESSURE: 135 MMHG | WEIGHT: 150 LBS

## 2019-02-04 DIAGNOSIS — F33.0 MAJOR DEPRESSIVE DISORDER, RECURRENT EPISODE, MILD (H): ICD-10-CM

## 2019-02-04 PROCEDURE — 99213 OFFICE O/P EST LOW 20 MIN: CPT | Performed by: INTERNAL MEDICINE

## 2019-02-04 RX ORDER — MIRTAZAPINE 15 MG/1
15 TABLET, FILM COATED ORAL AT BEDTIME
Qty: 30 TABLET | Refills: 1 | Status: SHIPPED | OUTPATIENT
Start: 2019-02-04 | End: 2019-04-15

## 2019-02-04 RX ORDER — CLONAZEPAM 0.5 MG/1
TABLET ORAL
COMMUNITY
Start: 2018-12-21 | End: 2020-08-20

## 2019-02-04 ASSESSMENT — MIFFLIN-ST. JEOR
SCORE: 1397.27
SCORE: 1397.53

## 2019-02-04 NOTE — NURSING NOTE
"No chief complaint on file.      Initial /67   Pulse 61   Resp 16   Ht 1.803 m (5' 10.98\")   Wt 68 kg (150 lb)   SpO2 96%   BMI 20.93 kg/m   Estimated body mass index is 20.93 kg/m  as calculated from the following:    Height as of this encounter: 1.803 m (5' 10.98\").    Weight as of this encounter: 68 kg (150 lb).    Medication Reconciliation: complete    ESS 13      Rupa Cruz MA      "

## 2019-02-04 NOTE — PATIENT INSTRUCTIONS
Your BMI is Body mass index is 20.93 kg/m .  Weight management is a personal decision.  If you are interested in exploring weight loss strategies, the following discussion covers the approaches that may be successful. Body mass index (BMI) is one way to tell whether you are at a healthy weight, overweight, or obese. It measures your weight in relation to your height.  A BMI of 18.5 to 24.9 is in the healthy range. A person with a BMI of 25 to 29.9 is considered overweight, and someone with a BMI of 30 or greater is considered obese. More than two-thirds of American adults are considered overweight or obese.  Being overweight or obese increases the risk for further weight gain. Excess weight may lead to heart disease and diabetes.  Creating and following plans for healthy eating and physical activity may help you improve your health.  Weight control is part of healthy lifestyle and includes exercise, emotional health, and healthy eating habits. Careful eating habits lifelong are the mainstay of weight control. Though there are significant health benefits from weight loss, long-term weight loss with diet alone may be very difficult to achieve- studies show long-term success with dietary management in less than 10% of people. Attaining a healthy weight may be especially difficult to achieve in those with severe obesity. In some cases, medications, devices and surgical management might be considered.  What can you do?  If you are overweight or obese and are interested in methods for weight loss, you should discuss this with your provider.     Consider reducing daily calorie intake by 500 calories.     Keep a food journal.     Avoiding skipping meals, consider cutting portions instead.    Diet combined with exercise helps maintain muscle while optimizing fat loss. Strength training is particularly important for building and maintaining muscle mass. Exercise helps reduce stress, increase energy, and improves fitness.  Increasing exercise without diet control, however, may not burn enough calories to loose weight.       Start walking three days a week 10-20 minutes at a time    Work towards walking thirty minutes five days a week     Eventually, increase the speed of your walking for 1-2 minutes at time    In addition, we recommend that you review healthy lifestyles and methods for weight loss available through the National Institutes of Health patient information sites:  http://win.niddk.nih.gov/publications/index.htm    And look into health and wellness programs that may be available through your health insurance provider, employer, local community center, or radha club.

## 2019-02-05 NOTE — PROGRESS NOTES
Visit Date:   02/04/2019      SLEEP PROGRESS NOTE      CHIEF COMPLAINT:  Followup for depression.      HISTORY OF PRESENT ILLNESS:  Mr. Quintanilla is an 83-year-old male who I first saw in clinic on 08/07/2018.  He was previously followed by Dr. Garcia, sleep specialist.  Briefly, he has central sleep apnea but is not a candidate for ASV and has a history of REM sleep behavioral disorder.      The patient had consulted me with symptoms of brain fog and we had agreed that mild degree of depression was possible.  He was started on mirtazapine at an initial dose of 7.5 mg and dose has now been titrated to 22.5 mg daily.      Today, the patient was accompanied by her daughter.  He reports that there is an improvement in his mood and daytime function with the use of mirtazapine.  Her daughter reports to me that there has been a significant improvement both in his physical and mental health since over the last year.  He denies any significant adverse effects with the use of  mirtazapine.  He reports that he no longer has significant brain fog the but does notices a heaviness around his eyes.      He continued to take clonazepam and denies any significant deterioration in dream enactment behavior.      His appetite has slightly improved.  He denies having any suicidal thoughts.  He has adequate sleep intake, averaging 6-7 hours.     MENTAL STATUS EXAM  Appearance: appropriately dressed and groomed  Attitude: cooperative  Eye Contact: normal  Speech: spontaneous, normal rate, tone and volume   Orientation: oreinted to person , place, time and situation  Mood:  stable mood,   Affect: Mood Congruient  Thought Process: no thought disorders, no delusions  Suicidal Ideation: No suicidal thoughts, intentions or plans  Hallucination: none     IMPRESSION AND PLAN:  Major depressive disorder, recurrent, mild.      TREATMENT PLAN:   1.  Continue mirtazapine at a dose of 22.5 mg daily.   2.  Follow up in 2 months.      I spent a total of  15 minutes with this patient, with more than 50% spent in counseling.         ALANNAH LOZANO MD             D: 2019   T: 2019   MT: JOSE      Name:     SERGO ROLON   MRN:      [id number]        Account:      [id number]   :      1935           Visit Date:   2019      Document: [id number]       cc: Lizbeth Roth MD

## 2019-04-08 ENCOUNTER — TELEPHONE (OUTPATIENT)
Dept: SLEEP MEDICINE | Facility: CLINIC | Age: 84
End: 2019-04-08

## 2019-04-08 ENCOUNTER — OFFICE VISIT (OUTPATIENT)
Dept: SLEEP MEDICINE | Facility: CLINIC | Age: 84
End: 2019-04-08
Payer: MEDICARE

## 2019-04-08 VITALS
SYSTOLIC BLOOD PRESSURE: 140 MMHG | OXYGEN SATURATION: 98 % | HEART RATE: 68 BPM | HEIGHT: 71 IN | WEIGHT: 147 LBS | BODY MASS INDEX: 20.58 KG/M2 | RESPIRATION RATE: 18 BRPM | DIASTOLIC BLOOD PRESSURE: 70 MMHG

## 2019-04-08 DIAGNOSIS — F33.0 MAJOR DEPRESSIVE DISORDER, RECURRENT, MILD (H): Primary | ICD-10-CM

## 2019-04-08 PROCEDURE — 99213 OFFICE O/P EST LOW 20 MIN: CPT | Performed by: INTERNAL MEDICINE

## 2019-04-08 ASSESSMENT — MIFFLIN-ST. JEOR: SCORE: 1383.6

## 2019-04-08 NOTE — NURSING NOTE
"Chief Complaint   Patient presents with     Sleep Problem     Medication follow up, would like to discuss the remeron        Initial /70   Pulse 68   Resp 18   Ht 1.803 m (5' 10.98\")   Wt 66.7 kg (147 lb)   SpO2 98%   BMI 20.51 kg/m   Estimated body mass index is 20.51 kg/m  as calculated from the following:    Height as of this encounter: 1.803 m (5' 10.98\").    Weight as of this encounter: 66.7 kg (147 lb).    Medication Reconciliation: complete    ESS 16    Rupa Cruz MA      "

## 2019-04-08 NOTE — TELEPHONE ENCOUNTER
Patient would like to inform Dr. Webber that he is still having issues with balance, he is scheduled to see a PT.

## 2019-04-08 NOTE — PATIENT INSTRUCTIONS
Your BMI is Body mass index is 20.51 kg/m .  Weight management is a personal decision.  If you are interested in exploring weight loss strategies, the following discussion covers the approaches that may be successful. Body mass index (BMI) is one way to tell whether you are at a healthy weight, overweight, or obese. It measures your weight in relation to your height.  A BMI of 18.5 to 24.9 is in the healthy range. A person with a BMI of 25 to 29.9 is considered overweight, and someone with a BMI of 30 or greater is considered obese. More than two-thirds of American adults are considered overweight or obese.  Being overweight or obese increases the risk for further weight gain. Excess weight may lead to heart disease and diabetes.  Creating and following plans for healthy eating and physical activity may help you improve your health.  Weight control is part of healthy lifestyle and includes exercise, emotional health, and healthy eating habits. Careful eating habits lifelong are the mainstay of weight control. Though there are significant health benefits from weight loss, long-term weight loss with diet alone may be very difficult to achieve- studies show long-term success with dietary management in less than 10% of people. Attaining a healthy weight may be especially difficult to achieve in those with severe obesity. In some cases, medications, devices and surgical management might be considered.  What can you do?  If you are overweight or obese and are interested in methods for weight loss, you should discuss this with your provider.     Consider reducing daily calorie intake by 500 calories.     Keep a food journal.     Avoiding skipping meals, consider cutting portions instead.    Diet combined with exercise helps maintain muscle while optimizing fat loss. Strength training is particularly important for building and maintaining muscle mass. Exercise helps reduce stress, increase energy, and improves fitness.  Increasing exercise without diet control, however, may not burn enough calories to loose weight.       Start walking three days a week 10-20 minutes at a time    Work towards walking thirty minutes five days a week     Eventually, increase the speed of your walking for 1-2 minutes at time    In addition, we recommend that you review healthy lifestyles and methods for weight loss available through the National Institutes of Health patient information sites:  http://win.niddk.nih.gov/publications/index.htm    And look into health and wellness programs that may be available through your health insurance provider, employer, local community center, or radha club.

## 2019-04-09 NOTE — PROGRESS NOTES
Visit Date:   2019      SLEEP CLINIC PROGRESS NOTE      CHIEF COMPLAINT:  Follow up for depression.      HISTORY OF PRESENT ILLNESS:  Mr. Rolon is an 83-year-old male who was first seen in my clinic on 2018.  He was previously followed by Dr. Garcia, sleep specialist.  He has a history of central sleep apnea but is not a candidate for ASV and has a history of REM sleep behavioral disorder.      The patient consulted me with symptoms of mental fogginess, and we had agreed that mild degree of depression was possible.  He was started on mirtazapine at a initial dose of 7.5 mg with the dose titrated to 15 mg and subsequently to 22.5 mg daily, increase of dose to 22.5 mg was made in 2018.      Today, the patient was accompanied by his daughter.  He reports that he feels better in his mood and has better daytime function with the use of mirtazapine.  Daughter agrees to this and describes that she has noticed a positive benefit in his mood and he has less bad days now.  The patient still complains of some degree of mental fogginess and wonders if this could be from the mirtazapine.  However, considering, he had these symptoms prior to initiation of mirtazapine, I think that it is unlikely.      We had a discussion regarding whether he should continue on mirtazapine or how long this should be continued.  Considering the positive benefit from the use of mirtazapine, my recommendation is to continue with at this time.  We can reevaluate after 4 to 6 months.      ASSESSMENT:  Major depressive disorder, recurrent, mild.      TREATMENT PLAN:   1.  Continue mirtazapine at a dose of 22.5 mg daily.   2.  Follow up in 4 months.      I spent a total of 15 minutes with this patient, with more than 50% spent in counseling.         ALANNAH LOZANO MD             D: 2019   T: 2019   MT: KATHLEEN      Name:     SERGO ROLON   MRN:      [id number]        Account:      [id number]   :      1935            Visit Date:   04/08/2019      Document: [id number]       cc: Lizbeth Roth MD

## 2019-04-15 DIAGNOSIS — F33.0 MAJOR DEPRESSIVE DISORDER, RECURRENT EPISODE, MILD (H): ICD-10-CM

## 2019-04-15 RX ORDER — MIRTAZAPINE 7.5 MG/1
7.5 TABLET, FILM COATED ORAL AT BEDTIME
Qty: 30 TABLET | Refills: 3 | Status: SHIPPED | OUTPATIENT
Start: 2019-04-15 | End: 2019-06-17

## 2019-04-15 RX ORDER — MIRTAZAPINE 15 MG/1
15 TABLET, FILM COATED ORAL AT BEDTIME
Qty: 30 TABLET | Refills: 3 | Status: SHIPPED | OUTPATIENT
Start: 2019-04-15 | End: 2019-09-19

## 2019-06-17 RX ORDER — MIRTAZAPINE 7.5 MG/1
7.5 TABLET, FILM COATED ORAL AT BEDTIME
Qty: 30 TABLET | Refills: 3 | Status: SHIPPED | OUTPATIENT
Start: 2019-06-17 | End: 2019-11-11 | Stop reason: DRUGHIGH

## 2019-08-09 ENCOUNTER — OFFICE VISIT (OUTPATIENT)
Dept: SLEEP MEDICINE | Facility: CLINIC | Age: 84
End: 2019-08-09
Payer: MEDICARE

## 2019-08-09 VITALS
RESPIRATION RATE: 14 BRPM | BODY MASS INDEX: 19.46 KG/M2 | WEIGHT: 139 LBS | DIASTOLIC BLOOD PRESSURE: 62 MMHG | HEIGHT: 71 IN | SYSTOLIC BLOOD PRESSURE: 111 MMHG | HEART RATE: 61 BPM | OXYGEN SATURATION: 99 %

## 2019-08-09 DIAGNOSIS — F33.40 RECURRENT MAJOR DEPRESSIVE DISORDER, IN REMISSION (H): Primary | ICD-10-CM

## 2019-08-09 PROCEDURE — 99214 OFFICE O/P EST MOD 30 MIN: CPT | Performed by: INTERNAL MEDICINE

## 2019-08-09 ASSESSMENT — MIFFLIN-ST. JEOR: SCORE: 1347.63

## 2019-08-09 NOTE — PROGRESS NOTES
Visit Date:   08/09/2019      CHIEF COMPLAINT:  Follow up for depression.      HISTORY OF PRESENT ILLNESS:  Mr. Quintanilla is an 83-year-old male who was first seen in my clinic on 08/07/2018.  He was sent for consultation by Dr. Garcia, sleep specialist.  He has a history of central sleep apnea, but is not a candidate for ASV, and also has a history of REM sleep behavioral disorder.     Today, he was accompanied by his daughter.      The patient had consulted me with symptoms of mental fogginess, which he had described as a brain fog.  We had agreed that a mild degree of depression was possible and he was started on mirtazapine.  Initially dose was 7.5 mg with subsequent titration up to 22.5 mg in 12/2018.      In previous visits, family members have reported that his mood seems to be more stable and better when he is on mirtazapine.  The patient's account of how he responded to mirtazapine has been variable.  At times, he has agreed that this helps, but at other times he has been keen to withdraw this medication.      He does have short-term memory loss and was previously evaluated by Neurology at [facility].  He tells me that there is an upcoming neuropsychological testing in about 3 months.      He reports that his mood is stable; however, he has nonspecific complaints which includes a sensation around his eyes and some degree of mental fogginess.  He also attributes that he is tired and this is from the mirtazapine.  He has lost weight.  Apparently eating habits are slightly better over the last few weeks and his appetite is okay.  He denies any suicidal ideation.      The patient averages about 9 hours of sleep.  He does take some daytime naps.     Patient is scheduled to have cognitive evaluation.     MENTAL STATUS EXAM  Appearance: well dressed and groomed  Attitude: cooperative  Behavior: normal  Eyre Contact: normal  Speech: normal  Orientation: oreinted to person , place, time and situation  Mood:   euthymic  Affect: Mood Congruient  Thought Process: linear, no though disorders, no delusions, no hallucinations   Suicidal Ideation: denies suicidal thoughts, no intention     IMPRESSION AND PLAN:  Major depressive disorder, mild, in remission.       Patient is eager to reduce the dose of mirtazapine and eventually discontinue it.  He has maintained stability in his mood for at least the last 6 months.  I will go along with his request to reduce the dose of mirtazapine and recommended that he reduce dose to 15 mg daily.      I will see him back in clinic in about 3 months and review progress.      I spent a total of 25 minutes with this patient, with more than 50% spent in counseling.         ALANNAH LOZANO MD             D: 2019   T: 2019   MT: MARIANO      Name:     SERGO ROLON   MRN:      [id number]        Account:      [id number]   :      1935           Visit Date:   2019      Document: [id number]       cc: Lizbeth Roth MD

## 2019-08-09 NOTE — NURSING NOTE
"Chief Complaint   Patient presents with     Sleep Problem     Med f/u       Initial /62   Pulse 61   Resp 14   Ht 1.803 m (5' 11\")   Wt 63 kg (139 lb)   SpO2 99%   BMI 19.39 kg/m   Estimated body mass index is 19.39 kg/m  as calculated from the following:    Height as of this encounter: 1.803 m (5' 11\").    Weight as of this encounter: 63 kg (139 lb).    Medication Reconciliation: complete     ESS   Yara Telles        "

## 2019-08-09 NOTE — PATIENT INSTRUCTIONS
Plan:     1. Cut down on Remeron to 15 mg daily     Your BMI is Body mass index is 19.39 kg/m .  Weight management is a personal decision.  If you are interested in exploring weight loss strategies, the following discussion covers the approaches that may be successful. Body mass index (BMI) is one way to tell whether you are at a healthy weight, overweight, or obese. It measures your weight in relation to your height.  A BMI of 18.5 to 24.9 is in the healthy range. A person with a BMI of 25 to 29.9 is considered overweight, and someone with a BMI of 30 or greater is considered obese. More than two-thirds of American adults are considered overweight or obese.  Being overweight or obese increases the risk for further weight gain. Excess weight may lead to heart disease and diabetes.  Creating and following plans for healthy eating and physical activity may help you improve your health.  Weight control is part of healthy lifestyle and includes exercise, emotional health, and healthy eating habits. Careful eating habits lifelong are the mainstay of weight control. Though there are significant health benefits from weight loss, long-term weight loss with diet alone may be very difficult to achieve- studies show long-term success with dietary management in less than 10% of people. Attaining a healthy weight may be especially difficult to achieve in those with severe obesity. In some cases, medications, devices and surgical management might be considered.  What can you do?  If you are overweight or obese and are interested in methods for weight loss, you should discuss this with your provider.     Consider reducing daily calorie intake by 500 calories.     Keep a food journal.     Avoiding skipping meals, consider cutting portions instead.    Diet combined with exercise helps maintain muscle while optimizing fat loss. Strength training is particularly important for building and maintaining muscle mass. Exercise helps reduce  stress, increase energy, and improves fitness. Increasing exercise without diet control, however, may not burn enough calories to loose weight.       Start walking three days a week 10-20 minutes at a time    Work towards walking thirty minutes five days a week     Eventually, increase the speed of your walking for 1-2 minutes at time    In addition, we recommend that you review healthy lifestyles and methods for weight loss available through the National Institutes of Health patient information sites:  http://win.niddk.nih.gov/publications/index.htm    And look into health and wellness programs that may be available through your health insurance provider, employer, local community center, or radha club.    Weight management plan Patient was referred to their PCP to discuss a diet and exercise plan.

## 2019-09-17 DIAGNOSIS — F33.0 MAJOR DEPRESSIVE DISORDER, RECURRENT EPISODE, MILD (H): ICD-10-CM

## 2019-09-19 RX ORDER — MIRTAZAPINE 15 MG/1
15 TABLET, FILM COATED ORAL AT BEDTIME
Qty: 30 TABLET | Refills: 3 | Status: SHIPPED | OUTPATIENT
Start: 2019-09-19 | End: 2020-08-11

## 2019-11-11 ENCOUNTER — OFFICE VISIT (OUTPATIENT)
Dept: SLEEP MEDICINE | Facility: CLINIC | Age: 84
End: 2019-11-11
Payer: MEDICARE

## 2019-11-11 VITALS
OXYGEN SATURATION: 98 % | DIASTOLIC BLOOD PRESSURE: 71 MMHG | SYSTOLIC BLOOD PRESSURE: 110 MMHG | BODY MASS INDEX: 18.62 KG/M2 | HEIGHT: 71 IN | HEART RATE: 76 BPM | RESPIRATION RATE: 16 BRPM | WEIGHT: 133 LBS

## 2019-11-11 DIAGNOSIS — F33.1 MODERATE EPISODE OF RECURRENT MAJOR DEPRESSIVE DISORDER (H): Primary | ICD-10-CM

## 2019-11-11 PROCEDURE — 99214 OFFICE O/P EST MOD 30 MIN: CPT | Performed by: INTERNAL MEDICINE

## 2019-11-11 ASSESSMENT — MIFFLIN-ST. JEOR: SCORE: 1315.41

## 2019-11-11 ASSESSMENT — PATIENT HEALTH QUESTIONNAIRE - PHQ9: SUM OF ALL RESPONSES TO PHQ QUESTIONS 1-9: 17

## 2019-11-11 NOTE — NURSING NOTE
"Chief Complaint   Patient presents with     Sleep Problem     Medication f/u       Initial /71   Pulse 76   Resp 16   Ht 1.803 m (5' 11\")   Wt 60.3 kg (133 lb)   SpO2 98%   BMI 18.55 kg/m   Estimated body mass index is 18.55 kg/m  as calculated from the following:    Height as of this encounter: 1.803 m (5' 11\").    Weight as of this encounter: 60.3 kg (133 lb).    Medication Reconciliation: complete     ESS 18  Yara Telles MA        "

## 2019-11-11 NOTE — PATIENT INSTRUCTIONS
Your BMI is Body mass index is 18.55 kg/m .  Weight management is a personal decision.  If you are interested in exploring weight loss strategies, the following discussion covers the approaches that may be successful. Body mass index (BMI) is one way to tell whether you are at a healthy weight, overweight, or obese. It measures your weight in relation to your height.  A BMI of 18.5 to 24.9 is in the healthy range. A person with a BMI of 25 to 29.9 is considered overweight, and someone with a BMI of 30 or greater is considered obese. More than two-thirds of American adults are considered overweight or obese.  Being overweight or obese increases the risk for further weight gain. Excess weight may lead to heart disease and diabetes.  Creating and following plans for healthy eating and physical activity may help you improve your health.  Weight control is part of healthy lifestyle and includes exercise, emotional health, and healthy eating habits. Careful eating habits lifelong are the mainstay of weight control. Though there are significant health benefits from weight loss, long-term weight loss with diet alone may be very difficult to achieve- studies show long-term success with dietary management in less than 10% of people. Attaining a healthy weight may be especially difficult to achieve in those with severe obesity. In some cases, medications, devices and surgical management might be considered.  What can you do?  If you are overweight or obese and are interested in methods for weight loss, you should discuss this with your provider.     Consider reducing daily calorie intake by 500 calories.     Keep a food journal.     Avoiding skipping meals, consider cutting portions instead.    Diet combined with exercise helps maintain muscle while optimizing fat loss. Strength training is particularly important for building and maintaining muscle mass. Exercise helps reduce stress, increase energy, and improves fitness.  Increasing exercise without diet control, however, may not burn enough calories to loose weight.       Start walking three days a week 10-20 minutes at a time    Work towards walking thirty minutes five days a week     Eventually, increase the speed of your walking for 1-2 minutes at time    In addition, we recommend that you review healthy lifestyles and methods for weight loss available through the National Institutes of Health patient information sites:  http://win.niddk.nih.gov/publications/index.htm    And look into health and wellness programs that may be available through your health insurance provider, employer, local community center, or radha club.

## 2019-11-12 NOTE — PROGRESS NOTES
"Visit Date:   11/11/2019      CHIEF COMPLAINT:  Followup for depression.      PRESENTING HISTORY:  Mr. Quintanilla is an 84-year-old male, who I first saw in clinic on 08/07/2018.  He had been followed by Dr. Garcia, sleep specialist, and had been sent for an evaluation based on his complaints of mental fogginess.  He has a history of central sleep apnea, but is not a candidate for ASV and also has REM sleep behavioral disorder.  The patient takes clonazepam 0.5 mg at night for REM sleep behavioral disorder.      He was accompanied to clinic today by his daughter.      The patient had consulted me with symptoms of mental fogginess, \"brain fog\" in his words.  We had agreed that a mild degree of depression was possible and I had started him on mirtazapine.  Initial dose was 7.5 mg, which was subsequently titrated up to 22.5 mg in 12/2018.  Family had previously reported that mirtazapine was helping to keep his mood stable and he had benefit from this medication.  The patient has, however, has given a variable account.  At times, he reported that this helped but on other occasions, he has been impatient to withdraw this medication.  At his request, I reduced the dose of mirtazapine to 15 mg on 08/09/2019.      The patient reports that since the reduction of the mirtazapine dose, he no longer experiences a dullness and ache around his eyes.  His daughter is of the opinion that his mood benefits from use of mirtazapine and has mostly been stable.  There are days when he is having feelings of hopelessness.  However, he denies any suicidal thoughts.  He also denies any delusions or hallucinations.  There was a time in the past when he would wake up thinking that there was another family member in the house, other than his wife, when in fact they were alone.  He was not particularly distressed about this thought and this is happening less now.      He has short-term memory loss and is followed by Neurology.  His weight has been " declining and he has been seen by his primary care physician.        IMPRESSION AND PLAN:      Major depressive disorder, recurrent.     The patient continues to have depressive symptoms.  His daughter's and family's report has been that he is benefiting from mirtazapine.  Side effects which occurred at 22.5 mg are better at a dose of 15 mg.  He denies suicidal thoughts or psychosis.  I still think that mirtazapine will be the best antidepressant agent for him considering his low appetite and sleep disturbances.  Change of therapy, runs the risk of mood deterioration. Picking another agent can also be complex, serotonin specific reuptake inhibitor and SNRIs can potentially worsen dream enactment. Bupropion may not be a great choice in the context of weight loss.  At this time, we have agreed to continue mirtazapine at the current dose.  I will see him back in another 3 months.     Plan:     1. Continue Mirtazapine 15 mg daily   2. Follow up in 3 months       I spent a total of 30 minutes with patient with more than 50% inc counseling     ALANNAH LOZANO MD             D: 2019   T: 2019   MT: GISELLE      Name:     SERGO ROLON   MRN:      [id number]        Account:      [id number]   :      1935           Visit Date:   2019      Document: [id number]       cc: Lizbeth Roth MD

## 2019-11-20 ENCOUNTER — ANCILLARY PROCEDURE (OUTPATIENT)
Dept: NUCLEAR MEDICINE | Facility: CLINIC | Age: 84
End: 2019-11-20
Attending: PSYCHIATRY & NEUROLOGY
Payer: MEDICARE

## 2019-11-20 DIAGNOSIS — R26.81 UNSTEADINESS: ICD-10-CM

## 2019-11-20 DIAGNOSIS — R29.6 FALLS: ICD-10-CM

## 2019-11-20 DIAGNOSIS — R41.3 MEMORY DEFICIT: ICD-10-CM

## 2019-11-20 DIAGNOSIS — R25.1 TREMOR: ICD-10-CM

## 2019-11-20 PROCEDURE — A9584 IODINE I-123 IOFLUPANE: HCPCS | Performed by: PSYCHIATRY & NEUROLOGY

## 2019-11-20 PROCEDURE — 78607 NM BRAIN IMAGING TOMOGRAPHIC (SPECT) DATSCAN: CPT | Performed by: RADIOLOGY

## 2020-02-19 ENCOUNTER — OFFICE VISIT (OUTPATIENT)
Dept: SLEEP MEDICINE | Facility: CLINIC | Age: 85
End: 2020-02-19
Payer: MEDICARE

## 2020-02-19 VITALS
SYSTOLIC BLOOD PRESSURE: 124 MMHG | RESPIRATION RATE: 16 BRPM | HEART RATE: 75 BPM | BODY MASS INDEX: 19.6 KG/M2 | OXYGEN SATURATION: 98 % | HEIGHT: 71 IN | WEIGHT: 140 LBS | DIASTOLIC BLOOD PRESSURE: 63 MMHG

## 2020-02-19 DIAGNOSIS — F33.0 MAJOR DEPRESSIVE DISORDER, RECURRENT EPISODE, MILD (H): ICD-10-CM

## 2020-02-19 PROCEDURE — 99214 OFFICE O/P EST MOD 30 MIN: CPT | Performed by: INTERNAL MEDICINE

## 2020-02-19 RX ORDER — CARBIDOPA AND LEVODOPA 25; 100 MG/1; MG/1
TABLET ORAL 3 TIMES DAILY
COMMUNITY
Start: 2020-01-25

## 2020-02-19 ASSESSMENT — MIFFLIN-ST. JEOR: SCORE: 1347.17

## 2020-02-19 NOTE — PROGRESS NOTES
"  Sleep progress note:    Date on this visit: 2/19/2020    Primary Physician: Lizbeth Rios     Chief complaint: Follow up for depression     PRESENTING HISTORY:  Mr. Quintanilla is an 84-year-old male, who I first saw in clinic on 08/07/2018.  He had been followed by Dr. Garcia, sleep specialist, and had been sent for an evaluation based on his complaints of mental fogginess.  He has a history of central sleep apnea, but is not a candidate for ASV and also has REM sleep behavioral disorder.  The patient takes clonazepam 0.5 mg at night for REM sleep behavioral disorder.      He was accompanied to clinic today by his daughter.      The patient had consulted me with symptoms of mental fogginess, \"brain fog\" in his words.  We had agreed that a mild degree of depression was possible and I had started him on mirtazapine.  Initial dose was 7.5 mg, which was subsequently titrated up to 22.5 mg in 12/2018.  Family had previously reported that mirtazapine was helping to keep his mood stable and he had benefit from this medication.  The patient has, however, has given a variable account.  At times, he reported that this helped but on other occasions, he has been impatient to withdraw this medication.  At his request, I reduced the dose of mirtazapine to 15 mg on 08/09/2019.     In the interim, patient has been diagnosed with Parkinson's disease. He has been started on Carbidopa- levodopa  mg TID. Starting dopaminergic therapy has made a significant difference for him.     Patient has remained stable in mood. His appetite is improving and sleep has been stable.     Jair goes to sleep at 11:00 PM during the week. He wakes up at 6:30 AM without an alarm. He falls asleep in 20 minutes.  Jair denies difficulty falling asleep.  Patient gets an average of 7-8 hours of sleep per night.     Allergies:    Allergies   Allergen Reactions     Lisinopril      Cough     No Clinical Screening - See Comments      Guerda nuts     " Penicillins Rash     Swelling       Medications:    Current Outpatient Medications   Medication Sig Dispense Refill     aspirin 81 MG tablet Take 81 mg by mouth daily       carbidopa-levodopa  MG PO tablet        cholecalciferol (VITAMIN D-1000 MAX ST) 1000 UNITS TABS Take 2,000 Units by mouth       clonazePAM (KLONOPIN) 0.5 MG tablet        losartan (COZAAR) 25 MG tablet Take 75 mg by mouth       Melatonin 10 MG TABS tablet Take 5 mg by mouth       mirtazapine (REMERON) 15 MG tablet Take 1 tablet (15 mg) by mouth At Bedtime 30 tablet 3     warfarin (COUMADIN) 5 MG tablet TAKE 5MG (1 TABLET) 4 DAYS A WEEK AND 2.5MG (1/2 TABLET) 3 DAYS A WEEK.         Problem List:  Patient Active Problem List    Diagnosis Date Noted     HTN (hypertension) 08/09/2018     Priority: Medium     Chronic systolic congestive heart failure (H) 08/09/2018     Priority: Medium     Mild episode of recurrent major depressive disorder (H) 08/09/2018     Priority: Medium     Central sleep apnea 08/09/2018     Priority: Medium     Preglaucoma 11/19/2014     Priority: Medium     Problem list name updated by automated process. Provider to review       Senile nuclear sclerosis 11/19/2014     Priority: Medium        Past Medical/Surgical History:  Past Medical History:   Diagnosis Date     Glaucoma suspect of both eyes      Hypertension      Nonsenile cataract      Prostate cancer (H)     43 radiation treatments     TIA (transient ischaemic attack)     x 2     No past surgical history on file.    Social History:  Social History     Socioeconomic History     Marital status:      Spouse name: Not on file     Number of children: Not on file     Years of education: Not on file     Highest education level: Not on file   Occupational History     Not on file   Social Needs     Financial resource strain: Not on file     Food insecurity:     Worry: Not on file     Inability: Not on file     Transportation needs:     Medical: Not on file      Non-medical: Not on file   Tobacco Use     Smoking status: Never Smoker     Smokeless tobacco: Never Used   Substance and Sexual Activity     Alcohol use: Not on file     Drug use: Not on file     Sexual activity: Not on file   Lifestyle     Physical activity:     Days per week: Not on file     Minutes per session: Not on file     Stress: Not on file   Relationships     Social connections:     Talks on phone: Not on file     Gets together: Not on file     Attends Mandaeism service: Not on file     Active member of club or organization: Not on file     Attends meetings of clubs or organizations: Not on file     Relationship status: Not on file     Intimate partner violence:     Fear of current or ex partner: Not on file     Emotionally abused: Not on file     Physically abused: Not on file     Forced sexual activity: Not on file   Other Topics Concern     Not on file   Social History Narrative     Not on file       Family History:  Family History   Problem Relation Age of Onset     Cancer Father 79        mastiziation of lung cancer     Cancer Brother 63        prostate     Cerebrovascular Disease Brother         TIA's     Cancer Sister 66        lung - complications - passed     Diabetes No family hx of      Hypertension No family hx of      Macular Degeneration No family hx of      Thyroid Disease No family hx of      Glaucoma Mother        Review of Systems:  A complete review of systems reviewed by me is negative with the exeption of what has been mentioned in the history of present illness.  CONSTITUTIONAL: NEGATIVE for weight gain/loss, fever, chills, sweats or night sweats, drug allergies.  EYES: NEGATIVE for changes in vision, blind spots, double vision.  ENT: NEGATIVE for ear pain, sore throat, sinus pain, post-nasal drip, runny nose, bloody nose  CARDIAC: NEGATIVE for fast heartbeats or fluttering in chest, chest pain or pressure, breathlessness when lying flat, swollen legs or swollen feet.  NEUROLOGIC:  "NEGATIVE headaches, weakness or numbness in the arms or legs.  DERMATOLOGIC: NEGATIVE for rashes, new moles or change in mole(s)  PULMONARY: NEGATIVE SOB at rest, SOB with activity, dry cough, productive cough, coughing up blood, wheezing or whistling when breathing.    GASTROINTESTINAL: NEGATIVE for nausea or vomitting, loose or watery stools, fat or grease in stools, constipation, abdominal pain, bowel movements black in color or blood noted.  GENITOURINARY: NEGATIVE for pain during urination, blood in urine, urinating more frequently than usual, irregular menstrual periods.  MUSCULOSKELETAL: NEGATIVE for muscle pain, bone or joint pain, swollen joints.  ENDOCRINE: NEGATIVE for increased thirst or urination, diabetes.  LYMPHATIC: NEGATIVE for swollen lymph nodes, lumps or bumps in the breasts or nipple discharge.    Physical Examination:  Vitals: /63   Pulse 75   Resp 16   Ht 1.803 m (5' 11\")   Wt 63.5 kg (140 lb)   SpO2 98%   BMI 19.53 kg/m    BMI= Body mass index is 19.53 kg/m .         [place] Total Score 11/11/2019   Total score - [place] 18       EFRAIN Total Score: 5 (02/19/20 1401)    GENERAL APPEARANCE: healthy, alert and no distress  MENTAL STATUS EXAM:  Appearance/Behavior: Neatly groomed  Speech: Normal  Mood/Affect: euthymic   Thought: No thought disorders, no delusions. No suicidal or homicidal thoughts   Perception: No hallucinations   Insight: Fair    Impression/Plan:    1. Major depressive disorder, recurrent, in remission   2. Parkinson's disease    - Patient' depression is in stable remission. He has not experienced any significant adverse effects with Mirtazapine 15 mg daily. His daughter and Neurology are of the opinion that he should continue Mirtazapine and patient agrees.     Plan:     1. Continue Mirtazapine 15 mg daily   2. Follow up in 6 months     CC: No ref. provider found        "

## 2020-02-19 NOTE — PATIENT INSTRUCTIONS
Your BMI is Body mass index is 19.53 kg/m .  Weight management is a personal decision.  If you are interested in exploring weight loss strategies, the following discussion covers the approaches that may be successful. Body mass index (BMI) is one way to tell whether you are at a healthy weight, overweight, or obese. It measures your weight in relation to your height.  A BMI of 18.5 to 24.9 is in the healthy range. A person with a BMI of 25 to 29.9 is considered overweight, and someone with a BMI of 30 or greater is considered obese. More than two-thirds of American adults are considered overweight or obese.  Being overweight or obese increases the risk for further weight gain. Excess weight may lead to heart disease and diabetes.  Creating and following plans for healthy eating and physical activity may help you improve your health.  Weight control is part of healthy lifestyle and includes exercise, emotional health, and healthy eating habits. Careful eating habits lifelong are the mainstay of weight control. Though there are significant health benefits from weight loss, long-term weight loss with diet alone may be very difficult to achieve- studies show long-term success with dietary management in less than 10% of people. Attaining a healthy weight may be especially difficult to achieve in those with severe obesity. In some cases, medications, devices and surgical management might be considered.  What can you do?  If you are overweight or obese and are interested in methods for weight loss, you should discuss this with your provider.     Consider reducing daily calorie intake by 500 calories.     Keep a food journal.     Avoiding skipping meals, consider cutting portions instead.    Diet combined with exercise helps maintain muscle while optimizing fat loss. Strength training is particularly important for building and maintaining muscle mass. Exercise helps reduce stress, increase energy, and improves fitness.  Increasing exercise without diet control, however, may not burn enough calories to loose weight.       Start walking three days a week 10-20 minutes at a time    Work towards walking thirty minutes five days a week     Eventually, increase the speed of your walking for 1-2 minutes at time    In addition, we recommend that you review healthy lifestyles and methods for weight loss available through the National Institutes of Health patient information sites:  http://win.niddk.nih.gov/publications/index.htm    And look into health and wellness programs that may be available through your health insurance provider, employer, local community center, or radha club.    Weight management plan Patient was referred to their PCP to discuss a diet and exercise plan.

## 2020-08-11 ENCOUNTER — TELEPHONE (OUTPATIENT)
Dept: SLEEP MEDICINE | Facility: CLINIC | Age: 85
End: 2020-08-11

## 2020-08-11 DIAGNOSIS — F33.0 MAJOR DEPRESSIVE DISORDER, RECURRENT EPISODE, MILD (H): ICD-10-CM

## 2020-08-11 RX ORDER — MIRTAZAPINE 15 MG/1
15 TABLET, FILM COATED ORAL AT BEDTIME
Qty: 30 TABLET | Refills: 3 | Status: SHIPPED | OUTPATIENT
Start: 2020-08-11 | End: 2020-08-12

## 2020-08-12 ENCOUNTER — TELEPHONE (OUTPATIENT)
Dept: SLEEP MEDICINE | Facility: CLINIC | Age: 85
End: 2020-08-12

## 2020-08-12 DIAGNOSIS — F33.0 MAJOR DEPRESSIVE DISORDER, RECURRENT EPISODE, MILD (H): ICD-10-CM

## 2020-08-12 RX ORDER — MIRTAZAPINE 15 MG/1
15 TABLET, FILM COATED ORAL AT BEDTIME
Qty: 30 TABLET | Refills: 3 | Status: SHIPPED | OUTPATIENT
Start: 2020-08-12 | End: 2020-12-08

## 2020-08-12 NOTE — TELEPHONE ENCOUNTER
Patient spouse left message stating that she did not mention the new pharmacy. The refill request needs to go to the [facility] in [place] and requests the refill to [facility] be cancelled.

## 2020-08-20 RX ORDER — DONEPEZIL HYDROCHLORIDE 10 MG/1
TABLET, FILM COATED ORAL
COMMUNITY
Start: 2020-08-02

## 2020-08-20 RX ORDER — CLONAZEPAM 0.12 MG/1
TABLET, ORALLY DISINTEGRATING ORAL
COMMUNITY
Start: 2020-07-13

## 2020-08-20 NOTE — PROGRESS NOTES
"Jair Quintanilla is a 85 year old male who is being evaluated via a billable telephone visit.      The patient has been notified of following:     \"This telephone visit will be conducted via a call between you and your physician/provider. We have found that certain health care needs can be provided without the need for a physical exam.  This service lets us provide the care you need with a short phone conversation.  If a prescription is necessary we can send it directly to your pharmacy.  If lab work is needed we can place an order for that and you can then stop by our lab to have the test done at a later time.    Telephone visits are billed at different rates depending on your insurance coverage. During this emergency period, for some insurers they may be billed the same as an in-person visit.  Please reach out to your insurance provider with any questions.    If during the course of the call the physician/provider feels a telephone visit is not appropriate, you will not be charged for this service.\"    Patient has given verbal consent for Telephone visit?  Yes    What phone number would you like to be contacted at? [phone]    How would you like to obtain your AVS? Mail a copy    Phone call duration: 18 minutes    Cedric Webber MD    Chief complaint: Follow up for depression     Presenting History:     Mr. Quintanilla is an 84-year-old male, who I first saw in clinic on 08/07/2018.  He had been followed by Dr. Garcia, sleep specialist, and had been sent for an evaluation based on his complaints of mental fogginess.  He has a history of central sleep apnea, but is not a candidate for ASV and also has REM sleep behavioral disorder. The patient takes clonazepam 0.125 mg at night for REM sleep behavioral disorder. He is also on melatonin 10 mg at night.      The patient had consulted me with symptoms of mental fogginess, \"brain fog\" in his words.  We had agreed that a mild degree of depression was possible and I had " started him on mirtazapine.  Initial dose was 7.5 mg, which was subsequently titrated up to 22.5 mg in 12/2018.  Family had previously reported that mirtazapine was helping to keep his mood stable and he had benefit from this medication.  The patient has, however, has given a variable account.  At times, he reported that this helped but on other occasions, he has been impatient to withdraw this medication.  At his request, I reduced the dose of mirtazapine to 15 mg on 08/09/2019.      Patient has also been diagnosed with Parkinson's disease. He has been started on Carbidopa- levodopa which has made a significant difference for him.     Patient remains on Mirtazapine 15 mg daily. His mood has remained stable. He is sleeping fairly well with bedtime at 10- 11 pm and wake time around 8-9 am. His appetite is ok. He walks regularly during the day and lifts small weights to keep physically active. He denies suicidal thoughts.       MENTAL STATUS    Speech: normal  Orientation: oreinted to person , place, time and situation  Mood:  Euthmic, Affect: Mood Congruient  Thought Process: No thought disorders, no delusions   Suicidal Ideation: No suicidal thoughts     Impression & Plan:     1. Major depressive disorder, recurrent, in remission   2. Parkinson's' disease   3. REM sleep behavioral disorder     Plan:     1. Continue Mirtazapine 15 mg daily   2. Follow up in 6 months       I spent a total of 18 minutes with patient with more than 50% in counseling       Dr. Cedric Webber

## 2020-08-21 ENCOUNTER — VIRTUAL VISIT (OUTPATIENT)
Dept: SLEEP MEDICINE | Facility: CLINIC | Age: 85
End: 2020-08-21
Payer: MEDICARE

## 2020-08-21 DIAGNOSIS — F33.40 MAJOR DEPRESSIVE DISORDER, RECURRENT, IN REMISSION (H): Primary | ICD-10-CM

## 2020-08-21 PROCEDURE — 99442 ZZC PHYSICIAN TELEPHONE EVALUATION 11-20 MIN: CPT | Performed by: INTERNAL MEDICINE

## 2020-12-08 DIAGNOSIS — F33.0 MAJOR DEPRESSIVE DISORDER, RECURRENT EPISODE, MILD (H): ICD-10-CM

## 2020-12-08 RX ORDER — MIRTAZAPINE 15 MG/1
15 TABLET, FILM COATED ORAL AT BEDTIME
Qty: 30 TABLET | Refills: 3 | Status: SHIPPED | OUTPATIENT
Start: 2020-12-09 | End: 2021-04-06

## 2020-12-08 NOTE — TELEPHONE ENCOUNTER
Pended RX Mirtazapine 15 mg tabs, DISP:    30 Tabs  R 3, SIG:  Take 1 tablet (15mg) by mouth at Bedtime   Last Written Prescription Date:  8/12/2020  Last Fill Quantity: 30   # refills: 3  Last Office Visit: 8/21/2020  Future Office visit:       Routed to Dr. Cedric Webber for review    Routing refill request to provider for review/approval because:  Drug not on the [facility], [facility] or [facility] refill protocol or controlled substance

## 2021-02-01 VITALS — BODY MASS INDEX: 21 KG/M2 | WEIGHT: 150 LBS | HEIGHT: 71 IN

## 2021-02-01 ASSESSMENT — MIFFLIN-ST. JEOR: SCORE: 1387.53

## 2021-02-01 NOTE — PATIENT INSTRUCTIONS
Your BMI is Body mass index is 20.92 kg/m .  Weight management is a personal decision.  If you are interested in exploring weight loss strategies, the following discussion covers the approaches that may be successful. Body mass index (BMI) is one way to tell whether you are at a healthy weight, overweight, or obese. It measures your weight in relation to your height.  A BMI of 18.5 to 24.9 is in the healthy range. A person with a BMI of 25 to 29.9 is considered overweight, and someone with a BMI of 30 or greater is considered obese. More than two-thirds of [place] adults are considered overweight or obese.  Being overweight or obese increases the risk for further weight gain. Excess weight may lead to heart disease and diabetes.  Creating and following plans for healthy eating and physical activity may help you improve your health.  Weight control is part of healthy lifestyle and includes exercise, emotional health, and healthy eating habits. Careful eating habits lifelong are the mainstay of weight control. Though there are significant health benefits from weight loss, long-term weight loss with diet alone may be very difficult to achieve- studies show long-term success with dietary management in less than 10% of people. Attaining a healthy weight may be especially difficult to achieve in those with severe obesity. In some cases, medications, devices and surgical management might be considered.  What can you do?  If you are overweight or obese and are interested in methods for weight loss, you should discuss this with your provider.     Consider reducing daily calorie intake by 500 calories.     Keep a food journal.     Avoiding skipping meals, consider cutting portions instead.    Diet combined with exercise helps maintain muscle while optimizing fat loss. Strength training is particularly important for building and maintaining muscle mass. Exercise helps reduce stress, increase energy, and improves fitness.  Increasing exercise without diet control, however, may not burn enough calories to loose weight.       Start walking three days a week 10-20 minutes at a time    Work towards walking thirty minutes five days a week     Eventually, increase the speed of your walking for 1-2 minutes at time    In addition, we recommend that you review healthy lifestyles and methods for weight loss available through the [employer] of [employer] patient information sites:  http://win.niddk.nih.gov/publications/index.htm    And look into health and wellness programs that may be available through your health insurance provider, employer, local community center, or [employer].

## 2021-02-01 NOTE — PROGRESS NOTES
"Jair is a 85 year old who is being evaluated via a billable telephone visit.      What phone number would you like to be contacted at? [phone]   How would you like to obtain your AVS? Mail a copy      Patient gave consent for phone visit.     Phone call duration: 22 minutes  Anny FELIXHector GONZALEZ, [facility], 2/1/2021 3:10 PM    Mr. Quintanilla is an 84-year-old male, who I first saw in clinic on 08/07/2018.  He had been followed by Dr. Garcia, sleep specialist, and was sent for an evaluation based on his complaints of mental fogginess.  He has a history of central sleep apnea, was intolerant of BPAP, did not try ASV but did not benefit from O2. He has REM sleep behavioral disorder for which he takes clonazepam 0.125 mg at night for REM sleep behavioral disorder. He is also on Melatonin at night.      The patient had consulted me with symptoms of mental fogginess, \"brain fog\" in his words.  We had agreed that a mild degree of depression was possible and I had started him on mirtazapine.  Initial dose was 7.5 mg, which was subsequently titrated up to 22.5 mg in 12/2018.  Family had previously reported that mirtazapine was helping to keep his mood stable and he had benefit from this medication.  The patient has, however, has given a variable account.  At times, he reported that this helped but on other occasions, he has been impatient to withdraw this medication.  At his request, I reduced the dose of mirtazapine to 15 mg on 08/09/2019.      Patient has been diagnosed with Parkinson's disease. He is Carbidopa- levodopa which made a significant difference for him.      Patient remains on Mirtazapine 15 mg daily. His mood has remained stable. He is sleeping fairly well with bedtime at 11 pm and wake time around 9:30 am. He has 3-4 arousals per night to use the bathroom and returns to sleep within 15 minutes. His appetite is good and he regained a good amount of weight. He walks regularly during the day and lifts small " weights to keep physically active. He denies suicidal thoughts.     Impression & Plan:     1. Major depressive disorder, recurrent, in remission   2. Parkinson's' disease   3. REM sleep behavioral disorder     Patient is in stable remission and continues to benefit from Mirtazapine 15 mg daily. We agreed to continue current dose of Mirtazapine.     Plan:     1. Continue Mirtazapine 15 mg daily   2. Follow up in 6 months     Dr. Cedric Webber

## 2021-02-02 ENCOUNTER — VIRTUAL VISIT (OUTPATIENT)
Dept: SLEEP MEDICINE | Facility: CLINIC | Age: 86
End: 2021-02-02
Payer: MEDICARE

## 2021-02-02 DIAGNOSIS — F33.40 MAJOR DEPRESSIVE DISORDER, RECURRENT, IN REMISSION (H): Primary | ICD-10-CM

## 2021-02-02 PROCEDURE — 99443 PR PHYSICIAN TELEPHONE EVALUATION 21-30 MIN: CPT | Performed by: INTERNAL MEDICINE

## 2021-02-03 NOTE — PROGRESS NOTES
AVS has been mailed to patients home address February 3, 2021  6 month recall set up.     Anny LEMA CMA Sleep Medicine

## 2021-04-06 DIAGNOSIS — F33.0 MAJOR DEPRESSIVE DISORDER, RECURRENT EPISODE, MILD (H): ICD-10-CM

## 2021-04-06 RX ORDER — MIRTAZAPINE 15 MG/1
15 TABLET, FILM COATED ORAL AT BEDTIME
Qty: 30 TABLET | Refills: 3 | Status: SHIPPED | OUTPATIENT
Start: 2021-04-06 | End: 2021-08-04

## 2021-08-04 DIAGNOSIS — F33.0 MAJOR DEPRESSIVE DISORDER, RECURRENT EPISODE, MILD (H): ICD-10-CM

## 2021-08-04 RX ORDER — MIRTAZAPINE 15 MG/1
15 TABLET, FILM COATED ORAL AT BEDTIME
Qty: 30 TABLET | Refills: 3 | Status: SHIPPED | OUTPATIENT
Start: 2021-08-04

## 2021-08-04 NOTE — TELEPHONE ENCOUNTER
Patient needs appointment to follow up on medication management, Clinic notified to schedule appointment.

## 2021-08-05 ENCOUNTER — TELEPHONE (OUTPATIENT)
Dept: SLEEP MEDICINE | Facility: CLINIC | Age: 86
End: 2021-08-05

## 2021-08-05 NOTE — TELEPHONE ENCOUNTER
Reason for call:  Other   Patient called regarding (reason for call): call back  Additional comments: Patient's wife called to follow up on request to schedule visit with Dr. Webber. She is reporting that the patient is currently admitted into the hospital for psychiatric care and she is unsure of when he will be available to schedule and she is requesting a callback to follow up    Phone number to reach patient:  Other phone number:  [phone]    Best Time:  any    Can we leave a detailed message on this number?  YES    Travel screening: Negative

## 2021-11-03 ENCOUNTER — LAB REQUISITION (OUTPATIENT)
Dept: LAB | Facility: CLINIC | Age: 86
End: 2021-11-03
Payer: MEDICARE

## 2021-11-03 DIAGNOSIS — I11.0 HYPERTENSIVE HEART DISEASE WITH HEART FAILURE (H): ICD-10-CM

## 2021-11-03 DIAGNOSIS — E55.9 VITAMIN D DEFICIENCY, UNSPECIFIED: ICD-10-CM

## 2021-11-03 DIAGNOSIS — F33.1 MAJOR DEPRESSIVE DISORDER, RECURRENT, MODERATE (H): ICD-10-CM

## 2021-11-04 LAB
ALBUMIN SERPL-MCNC: 3.5 G/DL (ref 3.5–5)
ALP SERPL-CCNC: 107 U/L (ref 45–120)
ALT SERPL W P-5'-P-CCNC: <9 U/L (ref 0–45)
ANION GAP SERPL CALCULATED.3IONS-SCNC: 7 MMOL/L (ref 5–18)
AST SERPL W P-5'-P-CCNC: 23 U/L (ref 0–40)
BASOPHILS # BLD AUTO: 0.1 10E3/UL (ref 0–0.2)
BASOPHILS NFR BLD AUTO: 1 %
BILIRUB SERPL-MCNC: 1.4 MG/DL (ref 0–1)
BUN SERPL-MCNC: 18 MG/DL (ref 8–28)
CALCIUM SERPL-MCNC: 10.6 MG/DL (ref 8.5–10.5)
CHLORIDE BLD-SCNC: 105 MMOL/L (ref 98–107)
CHOLEST SERPL-MCNC: 134 MG/DL
CO2 SERPL-SCNC: 28 MMOL/L (ref 22–31)
CREAT SERPL-MCNC: 1.1 MG/DL (ref 0.7–1.3)
EOSINOPHIL # BLD AUTO: 0.1 10E3/UL (ref 0–0.7)
EOSINOPHIL NFR BLD AUTO: 1 %
ERYTHROCYTE [DISTWIDTH] IN BLOOD BY AUTOMATED COUNT: 13.5 % (ref 10–15)
FASTING STATUS PATIENT QL REPORTED: NORMAL
GFR SERPL CREATININE-BSD FRML MDRD: 60 ML/MIN/1.73M2
GLUCOSE BLD-MCNC: 85 MG/DL (ref 70–125)
HCT VFR BLD AUTO: 45.5 % (ref 40–53)
HDLC SERPL-MCNC: 49 MG/DL
HGB BLD-MCNC: 14.8 G/DL (ref 13.3–17.7)
IMM GRANULOCYTES # BLD: 0 10E3/UL
IMM GRANULOCYTES NFR BLD: 0 %
LDLC SERPL CALC-MCNC: 72 MG/DL
LYMPHOCYTES # BLD AUTO: 1.8 10E3/UL (ref 0.8–5.3)
LYMPHOCYTES NFR BLD AUTO: 26 %
MCH RBC QN AUTO: 30.5 PG (ref 26.5–33)
MCHC RBC AUTO-ENTMCNC: 32.5 G/DL (ref 31.5–36.5)
MCV RBC AUTO: 94 FL (ref 78–100)
MONOCYTES # BLD AUTO: 0.5 10E3/UL (ref 0–1.3)
MONOCYTES NFR BLD AUTO: 7 %
NEUTROPHILS # BLD AUTO: 4.4 10E3/UL (ref 1.6–8.3)
NEUTROPHILS NFR BLD AUTO: 65 %
NRBC # BLD AUTO: 0 10E3/UL
NRBC BLD AUTO-RTO: 0 /100
PLATELET # BLD AUTO: 171 10E3/UL (ref 150–450)
POTASSIUM BLD-SCNC: 4.9 MMOL/L (ref 3.5–5)
PROT SERPL-MCNC: 6.3 G/DL (ref 6–8)
RBC # BLD AUTO: 4.86 10E6/UL (ref 4.4–5.9)
SODIUM SERPL-SCNC: 140 MMOL/L (ref 136–145)
TRIGL SERPL-MCNC: 64 MG/DL
TSH SERPL DL<=0.005 MIU/L-ACNC: 2.09 UIU/ML (ref 0.3–5)
WBC # BLD AUTO: 6.8 10E3/UL (ref 4–11)

## 2021-11-04 PROCEDURE — 36415 COLL VENOUS BLD VENIPUNCTURE: CPT | Mod: ORL | Performed by: FAMILY MEDICINE

## 2021-11-04 PROCEDURE — 80061 LIPID PANEL: CPT | Mod: ORL | Performed by: FAMILY MEDICINE

## 2021-11-04 PROCEDURE — 82306 VITAMIN D 25 HYDROXY: CPT | Mod: ORL | Performed by: FAMILY MEDICINE

## 2021-11-04 PROCEDURE — 85025 COMPLETE CBC W/AUTO DIFF WBC: CPT | Mod: ORL | Performed by: FAMILY MEDICINE

## 2021-11-04 PROCEDURE — 84443 ASSAY THYROID STIM HORMONE: CPT | Mod: ORL | Performed by: FAMILY MEDICINE

## 2021-11-04 PROCEDURE — P9603 ONE-WAY ALLOW PRORATED MILES: HCPCS | Mod: ORL | Performed by: FAMILY MEDICINE

## 2021-11-04 PROCEDURE — 80053 COMPREHEN METABOLIC PANEL: CPT | Mod: ORL | Performed by: FAMILY MEDICINE

## 2021-11-05 LAB — DEPRECATED CALCIDIOL+CALCIFEROL SERPL-MC: 52 UG/L (ref 30–80)

## 2021-11-10 ENCOUNTER — LAB REQUISITION (OUTPATIENT)
Dept: LAB | Facility: CLINIC | Age: 86
End: 2021-11-10
Payer: MEDICARE

## 2021-11-10 DIAGNOSIS — I48.91 UNSPECIFIED ATRIAL FIBRILLATION (H): ICD-10-CM

## 2021-11-11 LAB — INR PPP: 2.41 (ref 0.85–1.15)

## 2021-11-11 PROCEDURE — 85610 PROTHROMBIN TIME: CPT | Mod: ORL | Performed by: FAMILY MEDICINE

## 2021-11-11 PROCEDURE — 36415 COLL VENOUS BLD VENIPUNCTURE: CPT | Mod: ORL | Performed by: FAMILY MEDICINE

## 2021-11-11 PROCEDURE — P9603 ONE-WAY ALLOW PRORATED MILES: HCPCS | Mod: ORL | Performed by: FAMILY MEDICINE

## 2021-11-17 ENCOUNTER — LAB REQUISITION (OUTPATIENT)
Dept: LAB | Facility: CLINIC | Age: 86
End: 2021-11-17
Payer: MEDICARE

## 2021-11-17 DIAGNOSIS — E83.52 HYPERCALCEMIA: ICD-10-CM

## 2021-11-17 DIAGNOSIS — E80.6 OTHER DISORDERS OF BILIRUBIN METABOLISM: ICD-10-CM

## 2021-11-18 LAB
ALBUMIN SERPL-MCNC: 3.1 G/DL (ref 3.5–5)
ALP SERPL-CCNC: 150 U/L (ref 45–120)
ALT SERPL W P-5'-P-CCNC: 13 U/L (ref 0–45)
ANION GAP SERPL CALCULATED.3IONS-SCNC: 6 MMOL/L (ref 5–18)
AST SERPL W P-5'-P-CCNC: 21 U/L (ref 0–40)
BILIRUB DIRECT SERPL-MCNC: 0.4 MG/DL
BILIRUB INDIRECT SERPL-MCNC: 0.7 MG/DL (ref 0–1)
BILIRUB SERPL-MCNC: 1.1 MG/DL (ref 0–1)
BILIRUB SERPL-MCNC: 1.1 MG/DL (ref 0–1)
BUN SERPL-MCNC: 20 MG/DL (ref 8–28)
CALCIUM SERPL-MCNC: 10.4 MG/DL (ref 8.5–10.5)
CHLORIDE BLD-SCNC: 108 MMOL/L (ref 98–107)
CO2 SERPL-SCNC: 26 MMOL/L (ref 22–31)
CREAT SERPL-MCNC: 1.13 MG/DL (ref 0.7–1.3)
GFR SERPL CREATININE-BSD FRML MDRD: 59 ML/MIN/1.73M2
GLUCOSE BLD-MCNC: 86 MG/DL (ref 70–125)
LDH SERPL L TO P-CCNC: 163 U/L (ref 125–220)
POTASSIUM BLD-SCNC: 5.1 MMOL/L (ref 3.5–5)
PROT SERPL-MCNC: 6.1 G/DL (ref 6–8)
PTH-INTACT SERPL-MCNC: 271 PG/ML (ref 10–86)
SODIUM SERPL-SCNC: 140 MMOL/L (ref 136–145)

## 2021-11-18 PROCEDURE — 82248 BILIRUBIN DIRECT: CPT | Mod: ORL | Performed by: FAMILY MEDICINE

## 2021-11-18 PROCEDURE — 36415 COLL VENOUS BLD VENIPUNCTURE: CPT | Mod: ORL | Performed by: FAMILY MEDICINE

## 2021-11-18 PROCEDURE — 83970 ASSAY OF PARATHORMONE: CPT | Mod: ORL | Performed by: FAMILY MEDICINE

## 2021-11-18 PROCEDURE — P9603 ONE-WAY ALLOW PRORATED MILES: HCPCS | Mod: ORL | Performed by: FAMILY MEDICINE

## 2021-11-18 PROCEDURE — 80053 COMPREHEN METABOLIC PANEL: CPT | Mod: ORL | Performed by: FAMILY MEDICINE

## 2021-11-18 PROCEDURE — 83615 LACTATE (LD) (LDH) ENZYME: CPT | Mod: ORL | Performed by: FAMILY MEDICINE

## 2021-11-22 ENCOUNTER — LAB REQUISITION (OUTPATIENT)
Dept: LAB | Facility: CLINIC | Age: 86
End: 2021-11-22
Payer: MEDICARE

## 2021-11-22 DIAGNOSIS — I48.91 UNSPECIFIED ATRIAL FIBRILLATION (H): ICD-10-CM

## 2021-11-24 LAB — INR PPP: 1.78 (ref 0.85–1.15)

## 2021-11-24 PROCEDURE — 36415 COLL VENOUS BLD VENIPUNCTURE: CPT | Mod: ORL | Performed by: FAMILY MEDICINE

## 2021-11-24 PROCEDURE — 85610 PROTHROMBIN TIME: CPT | Mod: ORL | Performed by: FAMILY MEDICINE

## 2021-11-24 PROCEDURE — P9603 ONE-WAY ALLOW PRORATED MILES: HCPCS | Mod: ORL | Performed by: FAMILY MEDICINE

## 2021-12-08 ENCOUNTER — LAB REQUISITION (OUTPATIENT)
Dept: LAB | Facility: CLINIC | Age: 86
End: 2021-12-08
Payer: MEDICARE

## 2021-12-08 DIAGNOSIS — I48.91 UNSPECIFIED ATRIAL FIBRILLATION (H): ICD-10-CM

## 2021-12-09 LAB — INR PPP: 3.14 (ref 0.85–1.15)

## 2021-12-09 PROCEDURE — 85610 PROTHROMBIN TIME: CPT | Mod: ORL | Performed by: FAMILY MEDICINE

## 2021-12-09 PROCEDURE — 36415 COLL VENOUS BLD VENIPUNCTURE: CPT | Mod: ORL | Performed by: FAMILY MEDICINE

## 2021-12-09 PROCEDURE — P9603 ONE-WAY ALLOW PRORATED MILES: HCPCS | Mod: ORL | Performed by: FAMILY MEDICINE

## 2021-12-15 ENCOUNTER — LAB REQUISITION (OUTPATIENT)
Dept: LAB | Facility: CLINIC | Age: 86
End: 2021-12-15
Payer: MEDICARE

## 2021-12-15 DIAGNOSIS — D69.0 ALLERGIC PURPURA (H): ICD-10-CM

## 2021-12-16 LAB
BASOPHILS # BLD AUTO: 0 10E3/UL (ref 0–0.2)
BASOPHILS NFR BLD AUTO: 1 %
EOSINOPHIL # BLD AUTO: 0.2 10E3/UL (ref 0–0.7)
EOSINOPHIL NFR BLD AUTO: 3 %
ERYTHROCYTE [DISTWIDTH] IN BLOOD BY AUTOMATED COUNT: 14.5 % (ref 10–15)
HCT VFR BLD AUTO: 37.8 % (ref 40–53)
HGB BLD-MCNC: 12.2 G/DL (ref 13.3–17.7)
IMM GRANULOCYTES # BLD: 0 10E3/UL
IMM GRANULOCYTES NFR BLD: 0 %
LYMPHOCYTES # BLD AUTO: 1.6 10E3/UL (ref 0.8–5.3)
LYMPHOCYTES NFR BLD AUTO: 23 %
MCH RBC QN AUTO: 30.3 PG (ref 26.5–33)
MCHC RBC AUTO-ENTMCNC: 32.3 G/DL (ref 31.5–36.5)
MCV RBC AUTO: 94 FL (ref 78–100)
MONOCYTES # BLD AUTO: 0.6 10E3/UL (ref 0–1.3)
MONOCYTES NFR BLD AUTO: 8 %
NEUTROPHILS # BLD AUTO: 4.5 10E3/UL (ref 1.6–8.3)
NEUTROPHILS NFR BLD AUTO: 65 %
NRBC # BLD AUTO: 0 10E3/UL
NRBC BLD AUTO-RTO: 0 /100
PLATELET # BLD AUTO: 177 10E3/UL (ref 150–450)
RBC # BLD AUTO: 4.03 10E6/UL (ref 4.4–5.9)
WBC # BLD AUTO: 6.8 10E3/UL (ref 4–11)

## 2021-12-16 PROCEDURE — P9603 ONE-WAY ALLOW PRORATED MILES: HCPCS | Mod: ORL | Performed by: FAMILY MEDICINE

## 2021-12-16 PROCEDURE — 36415 COLL VENOUS BLD VENIPUNCTURE: CPT | Mod: ORL | Performed by: FAMILY MEDICINE

## 2021-12-16 PROCEDURE — 85025 COMPLETE CBC W/AUTO DIFF WBC: CPT | Mod: ORL | Performed by: FAMILY MEDICINE

## 2022-01-01 ENCOUNTER — LAB REQUISITION (OUTPATIENT)
Dept: LAB | Facility: CLINIC | Age: 87
End: 2022-01-01
Payer: MEDICARE

## 2022-01-01 DIAGNOSIS — R39.15 URGENCY OF URINATION: ICD-10-CM

## 2022-01-02 ENCOUNTER — LAB REQUISITION (OUTPATIENT)
Dept: LAB | Facility: CLINIC | Age: 87
End: 2022-01-02
Payer: MEDICARE

## 2022-01-02 DIAGNOSIS — R39.15 URGENCY OF URINATION: ICD-10-CM

## 2022-01-02 LAB
ALBUMIN UR-MCNC: 20 MG/DL
APPEARANCE UR: CLEAR
BILIRUB UR QL STRIP: NEGATIVE
CAOX CRY #/AREA URNS HPF: ABNORMAL /HPF
COLOR UR AUTO: YELLOW
GLUCOSE UR STRIP-MCNC: NEGATIVE MG/DL
HGB UR QL STRIP: NEGATIVE
KETONES UR STRIP-MCNC: NEGATIVE MG/DL
LEUKOCYTE ESTERASE UR QL STRIP: NEGATIVE
MUCOUS THREADS #/AREA URNS LPF: PRESENT /LPF
NITRATE UR QL: NEGATIVE
PH UR STRIP: 6.5 [PH] (ref 5–7)
RBC URINE: 1 /HPF
SP GR UR STRIP: 1.02 (ref 1–1.03)
UROBILINOGEN UR STRIP-MCNC: 2 MG/DL
WBC URINE: 1 /HPF

## 2022-01-02 PROCEDURE — 81001 URINALYSIS AUTO W/SCOPE: CPT | Mod: ORL | Performed by: FAMILY MEDICINE

## 2022-01-02 PROCEDURE — 87086 URINE CULTURE/COLONY COUNT: CPT | Mod: ORL | Performed by: FAMILY MEDICINE

## 2022-01-03 LAB — BACTERIA UR CULT: NO GROWTH

## 2023-01-01 NOTE — TELEPHONE ENCOUNTER
Patient needs medication Mirtazapine refilled prior to appointment scheduled on 8/21/20. Will run out before appointment.    Yes-Patient/Caregiver accepts free interpretation services...